# Patient Record
Sex: MALE | Race: OTHER | Employment: OTHER | URBAN - METROPOLITAN AREA
[De-identification: names, ages, dates, MRNs, and addresses within clinical notes are randomized per-mention and may not be internally consistent; named-entity substitution may affect disease eponyms.]

---

## 2017-01-13 PROBLEM — E11.9 DIABETES MELLITUS TYPE 2 WITHOUT RETINOPATHY (HCC): Status: ACTIVE | Noted: 2017-01-13

## 2017-01-13 PROBLEM — H25.813 COMBINED FORMS OF AGE-RELATED CATARACT OF BOTH EYES: Status: ACTIVE | Noted: 2017-01-13

## 2017-03-27 PROBLEM — K11.8 MASS OF PAROTID GLAND: Status: ACTIVE | Noted: 2017-03-27

## 2018-02-08 PROCEDURE — 82607 VITAMIN B-12: CPT | Performed by: INTERNAL MEDICINE

## 2018-03-28 PROBLEM — G20 PARKINSONISM, UNSPECIFIED PARKINSONISM TYPE (HCC): Status: ACTIVE | Noted: 2018-03-28

## 2018-03-28 PROBLEM — R26.81 GAIT INSTABILITY: Status: ACTIVE | Noted: 2018-03-28

## 2021-01-01 ENCOUNTER — EXTERNAL FACILITY (OUTPATIENT)
Dept: FAMILY MEDICINE CLINIC | Facility: CLINIC | Age: 81
End: 2021-01-01

## 2021-01-01 ENCOUNTER — APPOINTMENT (OUTPATIENT)
Dept: GENERAL RADIOLOGY | Facility: HOSPITAL | Age: 81
DRG: 871 | End: 2021-01-01
Payer: MEDICARE

## 2021-01-01 ENCOUNTER — APPOINTMENT (OUTPATIENT)
Dept: GENERAL RADIOLOGY | Facility: HOSPITAL | Age: 81
DRG: 871 | End: 2021-01-01
Attending: NURSE PRACTITIONER
Payer: MEDICARE

## 2021-01-01 ENCOUNTER — APPOINTMENT (OUTPATIENT)
Dept: GENERAL RADIOLOGY | Facility: HOSPITAL | Age: 81
DRG: 871 | End: 2021-01-01
Attending: EMERGENCY MEDICINE
Payer: MEDICARE

## 2021-01-01 ENCOUNTER — APPOINTMENT (OUTPATIENT)
Dept: ULTRASOUND IMAGING | Facility: HOSPITAL | Age: 81
DRG: 871 | End: 2021-01-01
Attending: HOSPITALIST
Payer: MEDICARE

## 2021-01-01 ENCOUNTER — INITIAL APN SNF VISIT (OUTPATIENT)
Dept: INTERNAL MEDICINE CLINIC | Age: 81
End: 2021-01-01

## 2021-01-01 ENCOUNTER — APPOINTMENT (OUTPATIENT)
Dept: GENERAL RADIOLOGY | Facility: HOSPITAL | Age: 81
DRG: 871 | End: 2021-01-01
Attending: INTERNAL MEDICINE
Payer: MEDICARE

## 2021-01-01 ENCOUNTER — APPOINTMENT (OUTPATIENT)
Dept: CT IMAGING | Facility: HOSPITAL | Age: 81
DRG: 871 | End: 2021-01-01
Attending: EMERGENCY MEDICINE
Payer: MEDICARE

## 2021-01-01 ENCOUNTER — HOSPITAL ENCOUNTER (INPATIENT)
Facility: HOSPITAL | Age: 81
LOS: 9 days | Discharge: HOSPICE | DRG: 871 | End: 2021-01-01
Attending: EMERGENCY MEDICINE | Admitting: HOSPITALIST
Payer: MEDICARE

## 2021-01-01 VITALS
HEIGHT: 65 IN | OXYGEN SATURATION: 99 % | DIASTOLIC BLOOD PRESSURE: 61 MMHG | RESPIRATION RATE: 16 BRPM | HEART RATE: 95 BPM | WEIGHT: 121.81 LBS | TEMPERATURE: 99 F | SYSTOLIC BLOOD PRESSURE: 142 MMHG | BODY MASS INDEX: 20.29 KG/M2

## 2021-01-01 VITALS
HEART RATE: 83 BPM | TEMPERATURE: 98 F | SYSTOLIC BLOOD PRESSURE: 124 MMHG | RESPIRATION RATE: 18 BRPM | OXYGEN SATURATION: 97 % | DIASTOLIC BLOOD PRESSURE: 62 MMHG

## 2021-01-01 DIAGNOSIS — R53.1 GENERALIZED WEAKNESS: ICD-10-CM

## 2021-01-01 DIAGNOSIS — Z78.9 IMPAIRED MOBILITY AND ADLS: ICD-10-CM

## 2021-01-01 DIAGNOSIS — Z74.09 IMPAIRED MOBILITY AND ADLS: ICD-10-CM

## 2021-01-01 DIAGNOSIS — E78.49 OTHER HYPERLIPIDEMIA: ICD-10-CM

## 2021-01-01 DIAGNOSIS — R65.21 SEPTIC SHOCK (HCC): Primary | ICD-10-CM

## 2021-01-01 DIAGNOSIS — G20 DEMENTIA DUE TO PARKINSON'S DISEASE WITHOUT BEHAVIORAL DISTURBANCE (HCC): ICD-10-CM

## 2021-01-01 DIAGNOSIS — H91.93 DECREASED HEARING OF BOTH EARS: ICD-10-CM

## 2021-01-01 DIAGNOSIS — D72.829 LEUKOCYTOSIS, UNSPECIFIED TYPE: ICD-10-CM

## 2021-01-01 DIAGNOSIS — A41.9 SEPTIC SHOCK (HCC): ICD-10-CM

## 2021-01-01 DIAGNOSIS — H25.813 COMBINED FORMS OF AGE-RELATED CATARACT OF BOTH EYES: ICD-10-CM

## 2021-01-01 DIAGNOSIS — R53.81 PHYSICAL DECONDITIONING: ICD-10-CM

## 2021-01-01 DIAGNOSIS — E11.9 DIABETES MELLITUS TYPE 2 WITHOUT RETINOPATHY (HCC): ICD-10-CM

## 2021-01-01 DIAGNOSIS — N40.0 BENIGN NON-NODULAR PROSTATIC HYPERPLASIA WITHOUT LOWER URINARY TRACT SYMPTOMS: ICD-10-CM

## 2021-01-01 DIAGNOSIS — A41.9 SEPTIC SHOCK (HCC): Primary | ICD-10-CM

## 2021-01-01 DIAGNOSIS — R79.89 AZOTEMIA: ICD-10-CM

## 2021-01-01 DIAGNOSIS — R26.81 GAIT INSTABILITY: ICD-10-CM

## 2021-01-01 DIAGNOSIS — F02.80 DEMENTIA DUE TO PARKINSON'S DISEASE WITHOUT BEHAVIORAL DISTURBANCE (HCC): ICD-10-CM

## 2021-01-01 DIAGNOSIS — R41.841 COGNITIVE COMMUNICATION DEFICIT: ICD-10-CM

## 2021-01-01 DIAGNOSIS — G30.1 LATE ONSET ALZHEIMER'S DEMENTIA WITHOUT BEHAVIORAL DISTURBANCE (HCC): ICD-10-CM

## 2021-01-01 DIAGNOSIS — H25.13 SENILE NUCLEAR SCLEROSIS, BILATERAL: ICD-10-CM

## 2021-01-01 DIAGNOSIS — F02.80 LATE ONSET ALZHEIMER'S DEMENTIA WITHOUT BEHAVIORAL DISTURBANCE (HCC): ICD-10-CM

## 2021-01-01 DIAGNOSIS — R65.21 SEPTIC SHOCK (HCC): ICD-10-CM

## 2021-01-01 DIAGNOSIS — I10 ESSENTIAL HYPERTENSION, BENIGN: ICD-10-CM

## 2021-01-01 DIAGNOSIS — Z51.5 HOSPICE CARE PATIENT: Primary | ICD-10-CM

## 2021-01-01 DIAGNOSIS — G20 PARKINSONISM, UNSPECIFIED PARKINSONISM TYPE (HCC): Primary | ICD-10-CM

## 2021-01-01 PROCEDURE — 99233 SBSQ HOSP IP/OBS HIGH 50: CPT | Performed by: HOSPITALIST

## 2021-01-01 PROCEDURE — 74177 CT ABD & PELVIS W/CONTRAST: CPT | Performed by: EMERGENCY MEDICINE

## 2021-01-01 PROCEDURE — 3074F SYST BP LT 130 MM HG: CPT | Performed by: NURSE PRACTITIONER

## 2021-01-01 PROCEDURE — 02HV33Z INSERTION OF INFUSION DEVICE INTO SUPERIOR VENA CAVA, PERCUTANEOUS APPROACH: ICD-10-PCS | Performed by: EMERGENCY MEDICINE

## 2021-01-01 PROCEDURE — 93970 EXTREMITY STUDY: CPT | Performed by: HOSPITALIST

## 2021-01-01 PROCEDURE — 99306 1ST NF CARE HIGH MDM 50: CPT | Performed by: FAMILY MEDICINE

## 2021-01-01 PROCEDURE — 71045 X-RAY EXAM CHEST 1 VIEW: CPT | Performed by: EMERGENCY MEDICINE

## 2021-01-01 PROCEDURE — 99239 HOSP IP/OBS DSCHRG MGMT >30: CPT | Performed by: HOSPITALIST

## 2021-01-01 PROCEDURE — 99310 SBSQ NF CARE HIGH MDM 45: CPT | Performed by: NURSE PRACTITIONER

## 2021-01-01 PROCEDURE — 99223 1ST HOSP IP/OBS HIGH 75: CPT | Performed by: NURSE PRACTITIONER

## 2021-01-01 PROCEDURE — 71045 X-RAY EXAM CHEST 1 VIEW: CPT | Performed by: INTERNAL MEDICINE

## 2021-01-01 PROCEDURE — 70450 CT HEAD/BRAIN W/O DYE: CPT | Performed by: EMERGENCY MEDICINE

## 2021-01-01 PROCEDURE — 71045 X-RAY EXAM CHEST 1 VIEW: CPT | Performed by: NURSE PRACTITIONER

## 2021-01-01 PROCEDURE — 71275 CT ANGIOGRAPHY CHEST: CPT | Performed by: EMERGENCY MEDICINE

## 2021-01-01 PROCEDURE — 99232 SBSQ HOSP IP/OBS MODERATE 35: CPT | Performed by: HOSPITALIST

## 2021-01-01 PROCEDURE — 3078F DIAST BP <80 MM HG: CPT | Performed by: NURSE PRACTITIONER

## 2021-01-01 PROCEDURE — 3E0G76Z INTRODUCTION OF NUTRITIONAL SUBSTANCE INTO UPPER GI, VIA NATURAL OR ARTIFICIAL OPENING: ICD-10-PCS | Performed by: HOSPITALIST

## 2021-01-01 PROCEDURE — 99291 CRITICAL CARE FIRST HOUR: CPT | Performed by: INTERNAL MEDICINE

## 2021-01-01 PROCEDURE — 99291 CRITICAL CARE FIRST HOUR: CPT | Performed by: NURSE PRACTITIONER

## 2021-01-01 PROCEDURE — 1111F DSCHRG MED/CURRENT MED MERGE: CPT | Performed by: FAMILY MEDICINE

## 2021-01-01 RX ORDER — DEXTROSE AND SODIUM CHLORIDE 5; .45 G/100ML; G/100ML
INJECTION, SOLUTION INTRAVENOUS CONTINUOUS
Status: DISCONTINUED | OUTPATIENT
Start: 2021-01-01 | End: 2021-01-01

## 2021-01-01 RX ORDER — ACETAMINOPHEN 650 MG/1
650 SUPPOSITORY RECTAL ONCE
Status: COMPLETED | OUTPATIENT
Start: 2021-01-01 | End: 2021-01-01

## 2021-01-01 RX ORDER — POTASSIUM CHLORIDE 29.8 MG/ML
40 INJECTION INTRAVENOUS ONCE
Status: COMPLETED | OUTPATIENT
Start: 2021-01-01 | End: 2021-01-01

## 2021-01-01 RX ORDER — RIVASTIGMINE 4.6 MG/24H
1 PATCH, EXTENDED RELEASE TRANSDERMAL DAILY
Status: DISCONTINUED | OUTPATIENT
Start: 2021-01-01 | End: 2021-01-01

## 2021-01-01 RX ORDER — MINERAL OIL AND PETROLATUM 150; 830 MG/G; MG/G
OINTMENT OPHTHALMIC 3 TIMES DAILY
Status: DISCONTINUED | OUTPATIENT
Start: 2021-01-01 | End: 2021-01-01

## 2021-01-01 RX ORDER — POLYETHYLENE GLYCOL 3350 17 G/17G
17 POWDER, FOR SOLUTION ORAL DAILY PRN
COMMUNITY
End: 2021-01-01

## 2021-01-01 RX ORDER — ACETAMINOPHEN 650 MG/1
650 SUPPOSITORY RECTAL EVERY 6 HOURS PRN
Status: DISCONTINUED | OUTPATIENT
Start: 2021-01-01 | End: 2021-01-01

## 2021-01-01 RX ORDER — POTASSIUM CHLORIDE 14.9 MG/ML
20 INJECTION INTRAVENOUS ONCE
Status: DISCONTINUED | OUTPATIENT
Start: 2021-01-01 | End: 2021-01-01 | Stop reason: SDUPTHER

## 2021-01-01 RX ORDER — DEXTROSE MONOHYDRATE 25 G/50ML
50 INJECTION, SOLUTION INTRAVENOUS
Status: DISCONTINUED | OUTPATIENT
Start: 2021-01-01 | End: 2021-01-01

## 2021-01-01 RX ORDER — MELATONIN
100 DAILY
Status: DISCONTINUED | OUTPATIENT
Start: 2021-01-01 | End: 2021-01-01

## 2021-01-01 RX ORDER — POTASSIUM CHLORIDE 14.9 MG/ML
20 INJECTION INTRAVENOUS ONCE
Status: COMPLETED | OUTPATIENT
Start: 2021-01-01 | End: 2021-01-01

## 2021-01-01 RX ORDER — SODIUM CHLORIDE 9 MG/ML
INJECTION, SOLUTION INTRAVENOUS CONTINUOUS
Status: DISCONTINUED | OUTPATIENT
Start: 2021-01-01 | End: 2021-01-01

## 2021-01-01 RX ORDER — MAGNESIUM SULFATE HEPTAHYDRATE 40 MG/ML
2 INJECTION, SOLUTION INTRAVENOUS ONCE
Status: COMPLETED | OUTPATIENT
Start: 2021-01-01 | End: 2021-01-01

## 2021-01-01 RX ORDER — DEXTROSE AND SODIUM CHLORIDE 5; .9 G/100ML; G/100ML
INJECTION, SOLUTION INTRAVENOUS CONTINUOUS
Status: DISCONTINUED | OUTPATIENT
Start: 2021-01-01 | End: 2021-01-01

## 2021-01-01 RX ORDER — ACETAMINOPHEN 325 MG/1
325 TABLET ORAL EVERY 6 HOURS PRN
COMMUNITY
End: 2021-01-01

## 2021-01-01 RX ORDER — ACETAMINOPHEN 500 MG
1000 TABLET ORAL ONCE
Status: DISCONTINUED | OUTPATIENT
Start: 2021-01-01 | End: 2021-01-01

## 2021-01-01 RX ORDER — INSULIN ASPART 100 [IU]/ML
0.2 INJECTION, SOLUTION INTRAVENOUS; SUBCUTANEOUS ONCE
Status: COMPLETED | OUTPATIENT
Start: 2021-01-01 | End: 2021-01-01

## 2021-01-01 RX ORDER — FOLIC ACID 1 MG/1
1 TABLET ORAL DAILY
Status: DISCONTINUED | OUTPATIENT
Start: 2021-01-01 | End: 2021-01-01

## 2021-01-01 RX ORDER — SODIUM CHLORIDE, SODIUM LACTATE, POTASSIUM CHLORIDE, CALCIUM CHLORIDE 600; 310; 30; 20 MG/100ML; MG/100ML; MG/100ML; MG/100ML
INJECTION, SOLUTION INTRAVENOUS CONTINUOUS
Status: DISCONTINUED | OUTPATIENT
Start: 2021-01-01 | End: 2021-01-01

## 2021-01-01 RX ORDER — SODIUM CHLORIDE 9 MG/ML
1000 INJECTION, SOLUTION INTRAVENOUS CONTINUOUS
Status: DISCONTINUED | OUTPATIENT
Start: 2021-01-01 | End: 2021-01-01

## 2021-01-01 RX ORDER — MULTIPLE VITAMINS W/ MINERALS TAB 9MG-400MCG
1 TAB ORAL DAILY
Status: DISCONTINUED | OUTPATIENT
Start: 2021-01-01 | End: 2021-01-01

## 2021-01-01 RX ORDER — DEXTROSE AND SODIUM CHLORIDE 5; .45 G/100ML; G/100ML
100 INJECTION, SOLUTION INTRAVENOUS CONTINUOUS PRN
Status: DISCONTINUED | OUTPATIENT
Start: 2021-01-01 | End: 2021-01-01

## 2021-01-01 RX ORDER — DOCUSATE SODIUM 100 MG/1
100 CAPSULE, LIQUID FILLED ORAL 2 TIMES DAILY
COMMUNITY
End: 2021-01-01

## 2021-01-01 RX ORDER — HEPARIN SODIUM 5000 [USP'U]/ML
5000 INJECTION, SOLUTION INTRAVENOUS; SUBCUTANEOUS EVERY 8 HOURS SCHEDULED
Status: DISCONTINUED | OUTPATIENT
Start: 2021-01-01 | End: 2021-01-01

## 2021-01-01 RX ORDER — DEXTROSE MONOHYDRATE 50 MG/ML
INJECTION, SOLUTION INTRAVENOUS CONTINUOUS
Status: DISCONTINUED | OUTPATIENT
Start: 2021-01-01 | End: 2021-01-01

## 2021-02-01 ENCOUNTER — HOSPITAL ENCOUNTER (INPATIENT)
Age: 81
LOS: 2 days | Discharge: HOME HEALTH | DRG: 149 | End: 2021-02-03
Attending: EMERGENCY MEDICINE | Admitting: HOSPITALIST

## 2021-02-01 ENCOUNTER — APPOINTMENT (OUTPATIENT)
Dept: MRI IMAGING | Age: 81
DRG: 149 | End: 2021-02-01
Attending: EMERGENCY MEDICINE

## 2021-02-01 ENCOUNTER — APPOINTMENT (OUTPATIENT)
Dept: GENERAL RADIOLOGY | Age: 81
DRG: 149 | End: 2021-02-01
Attending: EMERGENCY MEDICINE

## 2021-02-01 ENCOUNTER — APPOINTMENT (OUTPATIENT)
Dept: CT IMAGING | Age: 81
DRG: 149 | End: 2021-02-01
Attending: EMERGENCY MEDICINE

## 2021-02-01 DIAGNOSIS — R42 VERTIGO: ICD-10-CM

## 2021-02-01 DIAGNOSIS — G91.2 NORMAL PRESSURE HYDROCEPHALUS (CMD): Primary | ICD-10-CM

## 2021-02-01 DIAGNOSIS — K11.8 PAROTID MASS: ICD-10-CM

## 2021-02-01 LAB
ALBUMIN SERPL-MCNC: 3.7 G/DL (ref 3.6–5.1)
ALBUMIN/GLOB SERPL: 1 {RATIO} (ref 1–2.4)
ALP SERPL-CCNC: 75 UNITS/L (ref 45–117)
ALT SERPL-CCNC: 19 UNITS/L
ANION GAP SERPL CALC-SCNC: 10 MMOL/L (ref 10–20)
APPEARANCE UR: CLEAR
AST SERPL-CCNC: 13 UNITS/L
BASOPHILS # BLD: 0.1 K/MCL (ref 0–0.3)
BASOPHILS NFR BLD: 1 %
BILIRUB SERPL-MCNC: 0.5 MG/DL (ref 0.2–1)
BILIRUB UR QL STRIP: NEGATIVE
BUN SERPL-MCNC: 16 MG/DL (ref 6–20)
BUN/CREAT SERPL: 25 (ref 7–25)
CALCIUM SERPL-MCNC: 9.2 MG/DL (ref 8.4–10.2)
CHLORIDE SERPL-SCNC: 105 MMOL/L (ref 98–107)
CO2 SERPL-SCNC: 28 MMOL/L (ref 21–32)
COLOR UR: ABNORMAL
CREAT SERPL-MCNC: 0.64 MG/DL (ref 0.67–1.17)
DEPRECATED RDW RBC: 39.1 FL (ref 39–50)
EOSINOPHIL # BLD: 0.4 K/MCL (ref 0–0.5)
EOSINOPHIL NFR BLD: 4 %
ERYTHROCYTE [DISTWIDTH] IN BLOOD: 13.8 % (ref 11–15)
ERYTHROCYTE [SEDIMENTATION RATE] IN BLOOD BY WESTERGREN METHOD: 24 MM/HR (ref 0–20)
FASTING DURATION TIME PATIENT: ABNORMAL H
GFR SERPLBLD BASED ON 1.73 SQ M-ARVRAT: >90 ML/MIN/1.73M2
GLOBULIN SER-MCNC: 3.6 G/DL (ref 2–4)
GLUCOSE SERPL-MCNC: 150 MG/DL (ref 65–99)
GLUCOSE UR STRIP-MCNC: >=500 MG/DL
HCT VFR BLD CALC: 42.9 % (ref 39–51)
HGB BLD-MCNC: 13.3 G/DL (ref 13–17)
HGB UR QL STRIP: NEGATIVE
IMM GRANULOCYTES # BLD AUTO: 0 K/MCL (ref 0–0.2)
IMM GRANULOCYTES # BLD: 1 %
KETONES UR STRIP-MCNC: 20 MG/DL
LEUKOCYTE ESTERASE UR QL STRIP: NEGATIVE
LIPASE SERPL-CCNC: 100 UNITS/L (ref 73–393)
LYMPHOCYTES # BLD: 2.2 K/MCL (ref 1–4)
LYMPHOCYTES NFR BLD: 27 %
MAGNESIUM SERPL-MCNC: 1.9 MG/DL (ref 1.7–2.4)
MCH RBC QN AUTO: 24.4 PG (ref 26–34)
MCHC RBC AUTO-ENTMCNC: 31 G/DL (ref 32–36.5)
MCV RBC AUTO: 78.9 FL (ref 78–100)
MONOCYTES # BLD: 0.7 K/MCL (ref 0.3–0.9)
MONOCYTES NFR BLD: 9 %
NEUTROPHILS # BLD: 4.9 K/MCL (ref 1.8–7.7)
NEUTROPHILS NFR BLD: 58 %
NITRITE UR QL STRIP: NEGATIVE
NRBC BLD MANUAL-RTO: 0 /100 WBC
PH UR STRIP: 5 [PH] (ref 5–7)
PLATELET # BLD AUTO: 248 K/MCL (ref 140–450)
POTASSIUM SERPL-SCNC: 3.8 MMOL/L (ref 3.4–5.1)
PROT SERPL-MCNC: 7.3 G/DL (ref 6.4–8.2)
PROT UR STRIP-MCNC: NEGATIVE MG/DL
RBC # BLD: 5.44 MIL/MCL (ref 4.5–5.9)
SARS-COV-2 RNA RESP QL NAA+PROBE: NOT DETECTED
SERVICE CMNT-IMP: NORMAL
SERVICE CMNT-IMP: NORMAL
SODIUM SERPL-SCNC: 139 MMOL/L (ref 135–145)
SP GR UR STRIP: 1.03 (ref 1–1.03)
UROBILINOGEN UR STRIP-MCNC: 0.2 MG/DL
WBC # BLD: 8.4 K/MCL (ref 4.2–11)

## 2021-02-01 PROCEDURE — 10003585 HB ROOM CHARGE INTERMEDIATE CARE

## 2021-02-01 PROCEDURE — 70450 CT HEAD/BRAIN W/O DYE: CPT

## 2021-02-01 PROCEDURE — 81003 URINALYSIS AUTO W/O SCOPE: CPT | Performed by: EMERGENCY MEDICINE

## 2021-02-01 PROCEDURE — 70551 MRI BRAIN STEM W/O DYE: CPT

## 2021-02-01 PROCEDURE — 71045 X-RAY EXAM CHEST 1 VIEW: CPT

## 2021-02-01 PROCEDURE — 93005 ELECTROCARDIOGRAM TRACING: CPT | Performed by: EMERGENCY MEDICINE

## 2021-02-01 PROCEDURE — U0003 INFECTIOUS AGENT DETECTION BY NUCLEIC ACID (DNA OR RNA); SEVERE ACUTE RESPIRATORY SYNDROME CORONAVIRUS 2 (SARS-COV-2) (CORONAVIRUS DISEASE [COVID-19]), AMPLIFIED PROBE TECHNIQUE, MAKING USE OF HIGH THROUGHPUT TECHNOLOGIES AS DESCRIBED BY CMS-2020-01-R: HCPCS | Performed by: EMERGENCY MEDICINE

## 2021-02-01 PROCEDURE — 36415 COLL VENOUS BLD VENIPUNCTURE: CPT

## 2021-02-01 PROCEDURE — 80053 COMPREHEN METABOLIC PANEL: CPT | Performed by: EMERGENCY MEDICINE

## 2021-02-01 PROCEDURE — 70544 MR ANGIOGRAPHY HEAD W/O DYE: CPT

## 2021-02-01 PROCEDURE — 85652 RBC SED RATE AUTOMATED: CPT | Performed by: EMERGENCY MEDICINE

## 2021-02-01 PROCEDURE — 99285 EMERGENCY DEPT VISIT HI MDM: CPT

## 2021-02-01 PROCEDURE — 85025 COMPLETE CBC W/AUTO DIFF WBC: CPT | Performed by: EMERGENCY MEDICINE

## 2021-02-01 PROCEDURE — 83690 ASSAY OF LIPASE: CPT | Performed by: EMERGENCY MEDICINE

## 2021-02-01 PROCEDURE — 83735 ASSAY OF MAGNESIUM: CPT | Performed by: EMERGENCY MEDICINE

## 2021-02-01 RX ORDER — LOSARTAN POTASSIUM 50 MG/1
50 TABLET ORAL DAILY
COMMUNITY

## 2021-02-01 RX ORDER — TRIAMCINOLONE ACETONIDE 1 MG/G
1 CREAM TOPICAL 2 TIMES DAILY PRN
COMMUNITY

## 2021-02-01 RX ORDER — CHOLECALCIFEROL (VITAMIN D3) 125 MCG
50 CAPSULE ORAL
COMMUNITY

## 2021-02-01 RX ORDER — PRAVASTATIN SODIUM 40 MG
20 TABLET ORAL DAILY
COMMUNITY

## 2021-02-01 RX ORDER — AMANTADINE HYDROCHLORIDE 100 MG/1
100 CAPSULE, GELATIN COATED ORAL
Status: ON HOLD | COMMUNITY
End: 2021-05-02 | Stop reason: SINTOL

## 2021-02-01 RX ORDER — LEVODOPA AND CARBIDOPA 95; 23.75 MG/1; MG/1
2 CAPSULE, EXTENDED RELEASE ORAL 2 TIMES DAILY
COMMUNITY

## 2021-02-01 RX ORDER — RIVASTIGMINE 4.6 MG/24H
1 PATCH, EXTENDED RELEASE TRANSDERMAL EVERY EVENING
COMMUNITY

## 2021-02-01 RX ORDER — 0.9 % SODIUM CHLORIDE 0.9 %
2 VIAL (ML) INJECTION EVERY 12 HOURS SCHEDULED
Status: DISCONTINUED | OUTPATIENT
Start: 2021-02-02 | End: 2021-02-03 | Stop reason: HOSPADM

## 2021-02-01 RX ORDER — ELECTROLYTES/DEXTROSE
1 SOLUTION, ORAL ORAL
COMMUNITY

## 2021-02-01 ASSESSMENT — PAIN SCALES - GENERAL: PAINLEVEL_OUTOF10: 0

## 2021-02-01 ASSESSMENT — ENCOUNTER SYMPTOMS
BACK PAIN: 0
EYE REDNESS: 0
COUGH: 0
ADENOPATHY: 0
CHILLS: 0
AGITATION: 0
ABDOMINAL PAIN: 0
DIZZINESS: 1

## 2021-02-01 ASSESSMENT — LIFESTYLE VARIABLES
ALCOHOL_USE_STATUS: NO OR LOW RISK WITH VALIDATED TOOL
AUDIT-C TOTAL SCORE: 0
HOW OFTEN DO YOU HAVE 6 OR MORE DRINKS ON ONE OCCASION: NEVER
HOW OFTEN DO YOU HAVE 6 OR MORE DRINKS ON ONE OCCASION: NEVER
HOW OFTEN DO YOU HAVE A DRINK CONTAINING ALCOHOL: NEVER
ALCOHOL_USE_STATUS: NO OR LOW RISK WITH VALIDATED TOOL
HOW MANY STANDARD DRINKS CONTAINING ALCOHOL DO YOU HAVE ON A TYPICAL DAY: 0,1 OR 2
HOW OFTEN DO YOU HAVE A DRINK CONTAINING ALCOHOL: NEVER
HOW MANY STANDARD DRINKS CONTAINING ALCOHOL DO YOU HAVE ON A TYPICAL DAY: 0,1 OR 2
AUDIT-C TOTAL SCORE: 0

## 2021-02-01 ASSESSMENT — COGNITIVE AND FUNCTIONAL STATUS - GENERAL
DO YOU HAVE DIFFICULTY DRESSING OR BATHING: NO
BECAUSE OF A PHYSICAL, MENTAL, OR EMOTIONAL CONDITION, DO YOU HAVE SERIOUS DIFFICULTY CONCENTRATING, REMEMBERING OR MAKING DECISIONS: YES
DO YOU HAVE SERIOUS DIFFICULTY WALKING OR CLIMBING STAIRS: YES
ARE YOU BLIND OR DO YOU HAVE SERIOUS DIFFICULTY SEEING, EVEN WHEN WEARING GLASSES: NO
ARE YOU DEAF OR DO YOU HAVE SERIOUS DIFFICULTY  HEARING: YES
BECAUSE OF A PHYSICAL, MENTAL, OR EMOTIONAL CONDITION, DO YOU HAVE DIFFICULTY DOING ERRANDS ALONE: NO

## 2021-02-01 ASSESSMENT — ACTIVITIES OF DAILY LIVING (ADL)
DRESSING YOURSELF: INDEPENDENT
ADL_SHORT_OF_BREATH: NO
DRESSING YOURSELF: INDEPENDENT
ADL_SCORE: 10
TRANSFERRING: NEEDS ASSISTANCE
BATHING: INDEPENDENT
MOBILITY_ASSIST_DEVICES: STANDARD WALKER
ADL_SCORE: 11
CONTINENCE: INDEPENDENT
ADL_BEFORE_ADMISSION: NEEDS/REQUIRES ASSISTANCE
FEEDING YOURSELF: INDEPENDENT
ADL_BEFORE_ADMISSION: NEEDS/REQUIRES ASSISTANCE
BATHING: INDEPENDENT
TOILETING: INDEPENDENT
CHRONIC_PAIN_PRESENT: NO
FEEDING YOURSELF: INDEPENDENT
CONTINENCE: NEEDS ASSISTANCE
RECENT_DECLINE_ADL: YES, DECLINE IN BATHING/DRESSING/FEEDING, COLLABORATE WITH PROVIDER (T)
TRANSFERRING: NEEDS ASSISTANCE
TOILETING: INDEPENDENT

## 2021-02-02 LAB
ANION GAP SERPL CALC-SCNC: 8 MMOL/L (ref 10–20)
ATRIAL RATE (BPM): 84
BASOPHILS # BLD: 0.1 K/MCL (ref 0–0.3)
BASOPHILS NFR BLD: 1 %
BUN SERPL-MCNC: 16 MG/DL (ref 6–20)
BUN/CREAT SERPL: 26 (ref 7–25)
CALCIUM SERPL-MCNC: 9.1 MG/DL (ref 8.4–10.2)
CHLORIDE SERPL-SCNC: 105 MMOL/L (ref 98–107)
CO2 SERPL-SCNC: 29 MMOL/L (ref 21–32)
CREAT SERPL-MCNC: 0.61 MG/DL (ref 0.67–1.17)
DEPRECATED RDW RBC: 39 FL (ref 39–50)
EOSINOPHIL # BLD: 0.3 K/MCL (ref 0–0.5)
EOSINOPHIL NFR BLD: 3 %
ERYTHROCYTE [DISTWIDTH] IN BLOOD: 13.9 % (ref 11–15)
FASTING DURATION TIME PATIENT: ABNORMAL H
GFR SERPLBLD BASED ON 1.73 SQ M-ARVRAT: >90 ML/MIN/1.73M2
GLUCOSE BLDC GLUCOMTR-MCNC: 171 MG/DL (ref 70–99)
GLUCOSE BLDC GLUCOMTR-MCNC: 223 MG/DL (ref 70–99)
GLUCOSE BLDC GLUCOMTR-MCNC: 300 MG/DL (ref 70–99)
GLUCOSE BLDC GLUCOMTR-MCNC: 310 MG/DL (ref 70–99)
GLUCOSE SERPL-MCNC: 156 MG/DL (ref 65–99)
HCT VFR BLD CALC: 42.2 % (ref 39–51)
HGB BLD-MCNC: 13.5 G/DL (ref 13–17)
IMM GRANULOCYTES # BLD AUTO: 0 K/MCL (ref 0–0.2)
IMM GRANULOCYTES # BLD: 0 %
LYMPHOCYTES # BLD: 2.2 K/MCL (ref 1–4)
LYMPHOCYTES NFR BLD: 23 %
MCH RBC QN AUTO: 25.1 PG (ref 26–34)
MCHC RBC AUTO-ENTMCNC: 32 G/DL (ref 32–36.5)
MCV RBC AUTO: 78.4 FL (ref 78–100)
MONOCYTES # BLD: 0.9 K/MCL (ref 0.3–0.9)
MONOCYTES NFR BLD: 10 %
NEUTROPHILS # BLD: 6.1 K/MCL (ref 1.8–7.7)
NEUTROPHILS NFR BLD: 63 %
NRBC BLD MANUAL-RTO: 0 /100 WBC
P AXIS (DEGREES): 60
PLATELET # BLD AUTO: 224 K/MCL (ref 140–450)
POTASSIUM SERPL-SCNC: 3.8 MMOL/L (ref 3.4–5.1)
PR-INTERVAL (MSEC): 130
QRS-INTERVAL (MSEC): 82
QT-INTERVAL (MSEC): 380
QTC: 449
R AXIS (DEGREES): -17
RAINBOW EXTRA TUBES HOLD SPECIMEN: NORMAL
RBC # BLD: 5.38 MIL/MCL (ref 4.5–5.9)
REPORT TEXT: NORMAL
SODIUM SERPL-SCNC: 138 MMOL/L (ref 135–145)
T AXIS (DEGREES): 46
VENTRICULAR RATE EKG/MIN (BPM): 84
WBC # BLD: 9.6 K/MCL (ref 4.2–11)

## 2021-02-02 PROCEDURE — 10002803 HB RX 637: Performed by: INTERNAL MEDICINE

## 2021-02-02 PROCEDURE — 97116 GAIT TRAINING THERAPY: CPT

## 2021-02-02 PROCEDURE — 36415 COLL VENOUS BLD VENIPUNCTURE: CPT | Performed by: HOSPITALIST

## 2021-02-02 PROCEDURE — 97161 PT EVAL LOW COMPLEX 20 MIN: CPT

## 2021-02-02 PROCEDURE — 10003585 HB ROOM CHARGE INTERMEDIATE CARE

## 2021-02-02 PROCEDURE — 80048 BASIC METABOLIC PNL TOTAL CA: CPT | Performed by: HOSPITALIST

## 2021-02-02 PROCEDURE — 10004651 HB RX, NO CHARGE ITEM: Performed by: INTERNAL MEDICINE

## 2021-02-02 PROCEDURE — 85025 COMPLETE CBC W/AUTO DIFF WBC: CPT | Performed by: HOSPITALIST

## 2021-02-02 PROCEDURE — 10002800 HB RX 250 W HCPCS: Performed by: INTERNAL MEDICINE

## 2021-02-02 PROCEDURE — 10004651 HB RX, NO CHARGE ITEM: Performed by: HOSPITALIST

## 2021-02-02 PROCEDURE — 10002803 HB RX 637: Performed by: PSYCHIATRY & NEUROLOGY

## 2021-02-02 RX ORDER — PRAVASTATIN SODIUM 20 MG
20 TABLET ORAL DAILY
Status: DISCONTINUED | OUTPATIENT
Start: 2021-02-02 | End: 2021-02-03 | Stop reason: HOSPADM

## 2021-02-02 RX ORDER — NICOTINE POLACRILEX 4 MG
30 LOZENGE BUCCAL PRN
Status: DISCONTINUED | OUTPATIENT
Start: 2021-02-02 | End: 2021-02-03 | Stop reason: HOSPADM

## 2021-02-02 RX ORDER — NICOTINE POLACRILEX 4 MG
15 LOZENGE BUCCAL PRN
Status: DISCONTINUED | OUTPATIENT
Start: 2021-02-02 | End: 2021-02-03 | Stop reason: HOSPADM

## 2021-02-02 RX ORDER — RIVASTIGMINE 4.6 MG/24H
1 PATCH, EXTENDED RELEASE TRANSDERMAL DAILY
Status: DISCONTINUED | OUTPATIENT
Start: 2021-02-02 | End: 2021-02-03 | Stop reason: HOSPADM

## 2021-02-02 RX ORDER — LOSARTAN POTASSIUM 50 MG/1
50 TABLET ORAL DAILY
Status: DISCONTINUED | OUTPATIENT
Start: 2021-02-02 | End: 2021-02-03 | Stop reason: HOSPADM

## 2021-02-02 RX ORDER — DEXTROSE MONOHYDRATE 25 G/50ML
12.5 INJECTION, SOLUTION INTRAVENOUS PRN
Status: DISCONTINUED | OUTPATIENT
Start: 2021-02-02 | End: 2021-02-03 | Stop reason: HOSPADM

## 2021-02-02 RX ORDER — DEXTROSE MONOHYDRATE 25 G/50ML
25 INJECTION, SOLUTION INTRAVENOUS PRN
Status: DISCONTINUED | OUTPATIENT
Start: 2021-02-02 | End: 2021-02-03 | Stop reason: HOSPADM

## 2021-02-02 RX ORDER — AMANTADINE HYDROCHLORIDE 100 MG/1
100 CAPSULE, GELATIN COATED ORAL DAILY
Status: DISCONTINUED | OUTPATIENT
Start: 2021-02-02 | End: 2021-02-03 | Stop reason: HOSPADM

## 2021-02-02 RX ADMIN — SODIUM CHLORIDE, PRESERVATIVE FREE 2 ML: 5 INJECTION INTRAVENOUS at 10:47

## 2021-02-02 RX ADMIN — ASPIRIN 81 MG: 81 TABLET, COATED ORAL at 10:46

## 2021-02-02 RX ADMIN — AMANTADINE 100 MG: 100 CAPSULE ORAL at 10:45

## 2021-02-02 RX ADMIN — RIVASTIGMINE 1 PATCH: 4.6 PATCH TRANSDERMAL at 13:34

## 2021-02-02 RX ADMIN — PRAVASTATIN SODIUM 20 MG: 20 TABLET ORAL at 10:46

## 2021-02-02 RX ADMIN — SODIUM CHLORIDE, PRESERVATIVE FREE 2 ML: 5 INJECTION INTRAVENOUS at 22:00

## 2021-02-02 RX ADMIN — LEVODOPA AND CARBIDOPA 1 CAPSULE: 95; 23.75 CAPSULE, EXTENDED RELEASE ORAL at 13:28

## 2021-02-02 RX ADMIN — LEVODOPA AND CARBIDOPA 1 CAPSULE: 95; 23.75 CAPSULE, EXTENDED RELEASE ORAL at 10:45

## 2021-02-02 RX ADMIN — INSULIN LISPRO 4 UNITS: 100 INJECTION, SOLUTION INTRAVENOUS; SUBCUTANEOUS at 15:02

## 2021-02-02 RX ADMIN — LOSARTAN POTASSIUM 50 MG: 50 TABLET, FILM COATED ORAL at 10:45

## 2021-02-02 ASSESSMENT — COGNITIVE AND FUNCTIONAL STATUS - GENERAL
BASIC_MOBILITY_CONVERTED_SCORE: 22.61
BASIC_MOBILITY_RAW_SCORE: 8

## 2021-02-02 ASSESSMENT — PAIN SCALES - GENERAL
PAINLEVEL_OUTOF10: 0
PAINLEVEL_OUTOF10: 0

## 2021-02-03 VITALS
RESPIRATION RATE: 18 BRPM | HEART RATE: 101 BPM | WEIGHT: 145.72 LBS | BODY MASS INDEX: 22.09 KG/M2 | SYSTOLIC BLOOD PRESSURE: 134 MMHG | HEIGHT: 68 IN | OXYGEN SATURATION: 98 % | TEMPERATURE: 97.7 F | DIASTOLIC BLOOD PRESSURE: 75 MMHG

## 2021-02-03 LAB
GLUCOSE BLDC GLUCOMTR-MCNC: 168 MG/DL (ref 70–99)
GLUCOSE BLDC GLUCOMTR-MCNC: 172 MG/DL (ref 70–99)
GLUCOSE BLDC GLUCOMTR-MCNC: 209 MG/DL (ref 70–99)

## 2021-02-03 PROCEDURE — 10004651 HB RX, NO CHARGE ITEM: Performed by: INTERNAL MEDICINE

## 2021-02-03 PROCEDURE — 10002800 HB RX 250 W HCPCS: Performed by: INTERNAL MEDICINE

## 2021-02-03 PROCEDURE — 10002803 HB RX 637: Performed by: INTERNAL MEDICINE

## 2021-02-03 PROCEDURE — 10004651 HB RX, NO CHARGE ITEM: Performed by: HOSPITALIST

## 2021-02-03 PROCEDURE — 97110 THERAPEUTIC EXERCISES: CPT

## 2021-02-03 PROCEDURE — 97116 GAIT TRAINING THERAPY: CPT

## 2021-02-03 PROCEDURE — 10002803 HB RX 637: Performed by: PSYCHIATRY & NEUROLOGY

## 2021-02-03 RX ADMIN — INSULIN LISPRO 2 UNITS: 100 INJECTION, SOLUTION INTRAVENOUS; SUBCUTANEOUS at 12:40

## 2021-02-03 RX ADMIN — PRAVASTATIN SODIUM 20 MG: 20 TABLET ORAL at 09:53

## 2021-02-03 RX ADMIN — INSULIN LISPRO 1 UNITS: 100 INJECTION, SOLUTION INTRAVENOUS; SUBCUTANEOUS at 09:53

## 2021-02-03 RX ADMIN — LEVODOPA AND CARBIDOPA 1 CAPSULE: 95; 23.75 CAPSULE, EXTENDED RELEASE ORAL at 09:59

## 2021-02-03 RX ADMIN — ASPIRIN 81 MG: 81 TABLET, COATED ORAL at 12:40

## 2021-02-03 RX ADMIN — LOSARTAN POTASSIUM 50 MG: 50 TABLET, FILM COATED ORAL at 09:53

## 2021-02-03 RX ADMIN — AMANTADINE 100 MG: 100 CAPSULE ORAL at 09:53

## 2021-02-03 RX ADMIN — RIVASTIGMINE 1 PATCH: 4.6 PATCH TRANSDERMAL at 09:55

## 2021-02-03 RX ADMIN — LEVODOPA AND CARBIDOPA 1 CAPSULE: 95; 23.75 CAPSULE, EXTENDED RELEASE ORAL at 12:40

## 2021-02-03 RX ADMIN — SODIUM CHLORIDE, PRESERVATIVE FREE 2 ML: 5 INJECTION INTRAVENOUS at 10:00

## 2021-02-03 ASSESSMENT — COGNITIVE AND FUNCTIONAL STATUS - GENERAL
BASIC_MOBILITY_CONVERTED_SCORE: 32.23
BASIC_MOBILITY_RAW_SCORE: 12

## 2021-02-03 ASSESSMENT — PAIN SCALES - GENERAL: PAINLEVEL_OUTOF10: 0

## 2021-05-01 ENCOUNTER — APPOINTMENT (OUTPATIENT)
Dept: GENERAL RADIOLOGY | Age: 81
End: 2021-05-01
Attending: EMERGENCY MEDICINE

## 2021-05-01 ENCOUNTER — HOSPITAL ENCOUNTER (OUTPATIENT)
Age: 81
Setting detail: OBSERVATION
Discharge: NURSING, REHAB OR LONG-TERM CARE | End: 2021-05-02
Attending: EMERGENCY MEDICINE | Admitting: HOSPITALIST

## 2021-05-01 DIAGNOSIS — L03.119 CELLULITIS OF LOWER EXTREMITY, UNSPECIFIED LATERALITY: ICD-10-CM

## 2021-05-01 DIAGNOSIS — R53.1 WEAKNESS GENERALIZED: Primary | ICD-10-CM

## 2021-05-01 DIAGNOSIS — G20.A1 PARKINSON DISEASE: ICD-10-CM

## 2021-05-01 LAB
ALBUMIN SERPL-MCNC: 3.9 G/DL (ref 3.6–5.1)
ALBUMIN/GLOB SERPL: 1 {RATIO} (ref 1–2.4)
ALP SERPL-CCNC: 121 UNITS/L (ref 45–117)
ALT SERPL-CCNC: 27 UNITS/L
ANION GAP SERPL CALC-SCNC: 10 MMOL/L (ref 10–20)
AST SERPL-CCNC: 17 UNITS/L
BASOPHILS # BLD: 0.1 K/MCL (ref 0–0.3)
BASOPHILS NFR BLD: 1 %
BILIRUB SERPL-MCNC: 0.3 MG/DL (ref 0.2–1)
BUN SERPL-MCNC: 13 MG/DL (ref 6–20)
BUN/CREAT SERPL: 21 (ref 7–25)
CALCIUM SERPL-MCNC: 9.6 MG/DL (ref 8.4–10.2)
CHLORIDE SERPL-SCNC: 103 MMOL/L (ref 98–107)
CO2 SERPL-SCNC: 27 MMOL/L (ref 21–32)
CREAT SERPL-MCNC: 0.62 MG/DL (ref 0.67–1.17)
DEPRECATED RDW RBC: 38.4 FL (ref 39–50)
EOSINOPHIL # BLD: 0.5 K/MCL (ref 0–0.5)
EOSINOPHIL NFR BLD: 5 %
ERYTHROCYTE [DISTWIDTH] IN BLOOD: 13.5 % (ref 11–15)
FASTING DURATION TIME PATIENT: ABNORMAL H
GFR SERPLBLD BASED ON 1.73 SQ M-ARVRAT: >90 ML/MIN/1.73M2
GLOBULIN SER-MCNC: 3.9 G/DL (ref 2–4)
GLUCOSE BLDC GLUCOMTR-MCNC: 197 MG/DL (ref 70–99)
GLUCOSE SERPL-MCNC: 255 MG/DL (ref 65–99)
HCT VFR BLD CALC: 41.6 % (ref 39–51)
HGB BLD-MCNC: 13.5 G/DL (ref 13–17)
IMM GRANULOCYTES # BLD AUTO: 0 K/MCL (ref 0–0.2)
IMM GRANULOCYTES # BLD: 0 %
LACTATE BLDV-SCNC: 2 MMOL/L (ref 0–2)
LACTATE BLDV-SCNC: 2.9 MMOL/L
LYMPHOCYTES # BLD: 2.9 K/MCL (ref 1–4)
LYMPHOCYTES NFR BLD: 32 %
MCH RBC QN AUTO: 25.7 PG (ref 26–34)
MCHC RBC AUTO-ENTMCNC: 32.5 G/DL (ref 32–36.5)
MCV RBC AUTO: 79.1 FL (ref 78–100)
MONOCYTES # BLD: 0.9 K/MCL (ref 0.3–0.9)
MONOCYTES NFR BLD: 11 %
NEUTROPHILS # BLD: 4.6 K/MCL (ref 1.8–7.7)
NEUTROPHILS NFR BLD: 51 %
NRBC BLD MANUAL-RTO: 0 /100 WBC
PLATELET # BLD AUTO: 243 K/MCL (ref 140–450)
POTASSIUM SERPL-SCNC: 3.9 MMOL/L (ref 3.4–5.1)
PROT SERPL-MCNC: 7.8 G/DL (ref 6.4–8.2)
RBC # BLD: 5.26 MIL/MCL (ref 4.5–5.9)
SARS-COV-2 RNA RESP QL NAA+PROBE: NOT DETECTED
SERVICE CMNT-IMP: NORMAL
SERVICE CMNT-IMP: NORMAL
SODIUM SERPL-SCNC: 136 MMOL/L (ref 135–145)
TROPONIN I SERPL HS-MCNC: <0.02 NG/ML
WBC # BLD: 9 K/MCL (ref 4.2–11)

## 2021-05-01 PROCEDURE — 96375 TX/PRO/DX INJ NEW DRUG ADDON: CPT

## 2021-05-01 PROCEDURE — 87635 SARS-COV-2 COVID-19 AMP PRB: CPT | Performed by: EMERGENCY MEDICINE

## 2021-05-01 PROCEDURE — 83605 ASSAY OF LACTIC ACID: CPT

## 2021-05-01 PROCEDURE — 84484 ASSAY OF TROPONIN QUANT: CPT | Performed by: EMERGENCY MEDICINE

## 2021-05-01 PROCEDURE — G0378 HOSPITAL OBSERVATION PER HR: HCPCS

## 2021-05-01 PROCEDURE — 80053 COMPREHEN METABOLIC PANEL: CPT | Performed by: EMERGENCY MEDICINE

## 2021-05-01 PROCEDURE — 87040 BLOOD CULTURE FOR BACTERIA: CPT | Performed by: EMERGENCY MEDICINE

## 2021-05-01 PROCEDURE — 10002800 HB RX 250 W HCPCS: Performed by: EMERGENCY MEDICINE

## 2021-05-01 PROCEDURE — 36415 COLL VENOUS BLD VENIPUNCTURE: CPT

## 2021-05-01 PROCEDURE — 99285 EMERGENCY DEPT VISIT HI MDM: CPT

## 2021-05-01 PROCEDURE — 96365 THER/PROPH/DIAG IV INF INIT: CPT

## 2021-05-01 PROCEDURE — 10002807 HB RX 258: Performed by: EMERGENCY MEDICINE

## 2021-05-01 PROCEDURE — 85025 COMPLETE CBC W/AUTO DIFF WBC: CPT | Performed by: EMERGENCY MEDICINE

## 2021-05-01 PROCEDURE — C9803 HOPD COVID-19 SPEC COLLECT: HCPCS

## 2021-05-01 PROCEDURE — 93005 ELECTROCARDIOGRAM TRACING: CPT | Performed by: EMERGENCY MEDICINE

## 2021-05-01 PROCEDURE — 71045 X-RAY EXAM CHEST 1 VIEW: CPT

## 2021-05-01 PROCEDURE — 10004281 HB COUNTER-STAFF TIME PER 15 MIN

## 2021-05-01 PROCEDURE — 96366 THER/PROPH/DIAG IV INF ADDON: CPT

## 2021-05-01 PROCEDURE — 82962 GLUCOSE BLOOD TEST: CPT

## 2021-05-01 RX ORDER — SODIUM CHLORIDE 9 MG/ML
INJECTION, SOLUTION INTRAVENOUS CONTINUOUS
Status: DISCONTINUED | OUTPATIENT
Start: 2021-05-02 | End: 2021-05-02

## 2021-05-01 RX ORDER — CEFAZOLIN SODIUM/WATER 2 G/20 ML
2000 SYRINGE (ML) INTRAVENOUS DAILY
Status: DISCONTINUED | OUTPATIENT
Start: 2021-05-02 | End: 2021-05-01

## 2021-05-01 RX ORDER — 0.9 % SODIUM CHLORIDE 0.9 %
2 VIAL (ML) INJECTION EVERY 12 HOURS SCHEDULED
Status: DISCONTINUED | OUTPATIENT
Start: 2021-05-01 | End: 2021-05-02 | Stop reason: HOSPADM

## 2021-05-01 RX ORDER — HEPARIN SODIUM 5000 [USP'U]/ML
5000 INJECTION, SOLUTION INTRAVENOUS; SUBCUTANEOUS EVERY 8 HOURS SCHEDULED
Status: DISCONTINUED | OUTPATIENT
Start: 2021-05-02 | End: 2021-05-02 | Stop reason: HOSPADM

## 2021-05-01 RX ORDER — CEFAZOLIN SODIUM/WATER 2 G/20 ML
2000 SYRINGE (ML) INTRAVENOUS ONCE
Status: COMPLETED | OUTPATIENT
Start: 2021-05-01 | End: 2021-05-01

## 2021-05-01 RX ADMIN — SODIUM CHLORIDE 1000 ML: 9 INJECTION, SOLUTION INTRAVENOUS at 20:07

## 2021-05-01 RX ADMIN — VANCOMYCIN HYDROCHLORIDE 1500 MG: 10 INJECTION, POWDER, LYOPHILIZED, FOR SOLUTION INTRAVENOUS at 21:08

## 2021-05-01 RX ADMIN — SODIUM CHLORIDE 1000 ML: 9 INJECTION, SOLUTION INTRAVENOUS at 21:09

## 2021-05-01 RX ADMIN — Medication 2000 MG: at 20:38

## 2021-05-01 RX ADMIN — SODIUM CHLORIDE 1000 ML: 9 INJECTION, SOLUTION INTRAVENOUS at 20:32

## 2021-05-01 ASSESSMENT — PAIN SCALES - GENERAL: PAINLEVEL_OUTOF10: 0

## 2021-05-01 ASSESSMENT — ENCOUNTER SYMPTOMS
EYE PAIN: 0
FEVER: 0
NAUSEA: 0
ABDOMINAL PAIN: 0
CHILLS: 0
ACTIVITY CHANGE: 0
WOUND: 0
VOMITING: 0
COUGH: 0
COLOR CHANGE: 0
CONFUSION: 0
BRUISES/BLEEDS EASILY: 0
BACK PAIN: 0
WEAKNESS: 1
WHEEZING: 0
NUMBNESS: 0
TREMORS: 1
EYE DISCHARGE: 0
EYE REDNESS: 0
DIZZINESS: 0
FACIAL SWELLING: 0
SORE THROAT: 0
POLYDIPSIA: 0
DIARRHEA: 0
SHORTNESS OF BREATH: 0
HEADACHES: 0
POLYPHAGIA: 0

## 2021-05-01 ASSESSMENT — COLUMBIA-SUICIDE SEVERITY RATING SCALE - C-SSRS
1. WITHIN THE PAST MONTH, HAVE YOU WISHED YOU WERE DEAD OR WISHED YOU COULD GO TO SLEEP AND NOT WAKE UP?: NO
6. HAVE YOU EVER DONE ANYTHING, STARTED TO DO ANYTHING, OR PREPARED TO DO ANYTHING TO END YOUR LIFE?: NO
2. HAVE YOU ACTUALLY HAD ANY THOUGHTS OF KILLING YOURSELF?: NO
IS THE PATIENT ABLE TO COMPLETE C-SSRS: YES

## 2021-05-01 ASSESSMENT — PATIENT HEALTH QUESTIONNAIRE - PHQ9
CLINICAL INTERPRETATION OF PHQ9 SCORE: NO FURTHER SCREENING NEEDED
IS PATIENT ABLE TO COMPLETE PHQ2 OR PHQ9: YES
SUM OF ALL RESPONSES TO PHQ9 QUESTIONS 1 AND 2: 0
1. LITTLE INTEREST OR PLEASURE IN DOING THINGS: NOT AT ALL
SUM OF ALL RESPONSES TO PHQ9 QUESTIONS 1 AND 2: 0
CLINICAL INTERPRETATION OF PHQ2 SCORE: NO FURTHER SCREENING NEEDED
2. FEELING DOWN, DEPRESSED OR HOPELESS: NOT AT ALL

## 2021-05-01 ASSESSMENT — ACTIVITIES OF DAILY LIVING (ADL)
ADL_BEFORE_ADMISSION: INDEPENDENT
ADL_SCORE: 12
ADL_SHORT_OF_BREATH: NO
CHRONIC_PAIN_PRESENT: NO
RECENT_DECLINE_ADL: YES, DECLINE IN AMBULATION/TRANSFERRING, COLLABORATE WITH PROVIDER (T)

## 2021-05-01 ASSESSMENT — COGNITIVE AND FUNCTIONAL STATUS - GENERAL
BECAUSE OF A PHYSICAL, MENTAL, OR EMOTIONAL CONDITION, DO YOU HAVE SERIOUS DIFFICULTY CONCENTRATING, REMEMBERING OR MAKING DECISIONS: YES
BECAUSE OF A PHYSICAL, MENTAL, OR EMOTIONAL CONDITION, DO YOU HAVE DIFFICULTY DOING ERRANDS ALONE: YES
DO YOU HAVE DIFFICULTY DRESSING OR BATHING: YES
DO YOU HAVE SERIOUS DIFFICULTY WALKING OR CLIMBING STAIRS: YES
ARE YOU BLIND OR DO YOU HAVE SERIOUS DIFFICULTY SEEING, EVEN WHEN WEARING GLASSES: NO
ARE YOU DEAF OR DO YOU HAVE SERIOUS DIFFICULTY  HEARING: YES

## 2021-05-01 ASSESSMENT — LIFESTYLE VARIABLES
AUDIT-C TOTAL SCORE: 0
HOW MANY STANDARD DRINKS CONTAINING ALCOHOL DO YOU HAVE ON A TYPICAL DAY: 0,1 OR 2
HOW OFTEN DO YOU HAVE 6 OR MORE DRINKS ON ONE OCCASION: NEVER
HOW OFTEN DO YOU HAVE A DRINK CONTAINING ALCOHOL: NEVER
ALCOHOL_USE_STATUS: NO OR LOW RISK WITH VALIDATED TOOL

## 2021-05-02 VITALS
SYSTOLIC BLOOD PRESSURE: 114 MMHG | TEMPERATURE: 97.5 F | RESPIRATION RATE: 16 BRPM | HEIGHT: 65 IN | HEART RATE: 81 BPM | DIASTOLIC BLOOD PRESSURE: 68 MMHG | OXYGEN SATURATION: 99 % | BODY MASS INDEX: 26.34 KG/M2 | WEIGHT: 158.07 LBS

## 2021-05-02 LAB
ALBUMIN SERPL-MCNC: 3 G/DL (ref 3.6–5.1)
ALBUMIN/GLOB SERPL: 1 {RATIO} (ref 1–2.4)
ALP SERPL-CCNC: 81 UNITS/L (ref 45–117)
ALT SERPL-CCNC: 25 UNITS/L
ANION GAP SERPL CALC-SCNC: 8 MMOL/L (ref 10–20)
APPEARANCE UR: CLEAR
AST SERPL-CCNC: 12 UNITS/L
ATRIAL RATE (BPM): 101
BASOPHILS # BLD: 0.1 K/MCL (ref 0–0.3)
BASOPHILS NFR BLD: 1 %
BILIRUB SERPL-MCNC: 0.4 MG/DL (ref 0.2–1)
BILIRUB UR QL STRIP: NEGATIVE
BUN SERPL-MCNC: 11 MG/DL (ref 6–20)
BUN/CREAT SERPL: 16 (ref 7–25)
CALCIUM SERPL-MCNC: 8.5 MG/DL (ref 8.4–10.2)
CHLORIDE SERPL-SCNC: 110 MMOL/L (ref 98–107)
CO2 SERPL-SCNC: 27 MMOL/L (ref 21–32)
COLOR UR: ABNORMAL
CREAT SERPL-MCNC: 0.7 MG/DL (ref 0.67–1.17)
DEPRECATED RDW RBC: 38.5 FL (ref 39–50)
EOSINOPHIL # BLD: 0.4 K/MCL (ref 0–0.5)
EOSINOPHIL NFR BLD: 5 %
ERYTHROCYTE [DISTWIDTH] IN BLOOD: 13.4 % (ref 11–15)
FASTING DURATION TIME PATIENT: ABNORMAL H
GFR SERPLBLD BASED ON 1.73 SQ M-ARVRAT: 90 ML/MIN/1.73M2
GLOBULIN SER-MCNC: 3 G/DL (ref 2–4)
GLUCOSE BLDC GLUCOMTR-MCNC: 209 MG/DL (ref 70–99)
GLUCOSE BLDC GLUCOMTR-MCNC: 261 MG/DL (ref 70–99)
GLUCOSE SERPL-MCNC: 218 MG/DL (ref 65–99)
GLUCOSE UR STRIP-MCNC: >=500 MG/DL
HCT VFR BLD CALC: 34.7 % (ref 39–51)
HGB BLD-MCNC: 11.4 G/DL (ref 13–17)
HGB UR QL STRIP: NEGATIVE
IMM GRANULOCYTES # BLD AUTO: 0 K/MCL (ref 0–0.2)
IMM GRANULOCYTES # BLD: 1 %
KETONES UR STRIP-MCNC: NEGATIVE MG/DL
LEUKOCYTE ESTERASE UR QL STRIP: NEGATIVE
LYMPHOCYTES # BLD: 2.3 K/MCL (ref 1–4)
LYMPHOCYTES NFR BLD: 26 %
MAGNESIUM SERPL-MCNC: 1.7 MG/DL (ref 1.7–2.4)
MCH RBC QN AUTO: 26.2 PG (ref 26–34)
MCHC RBC AUTO-ENTMCNC: 32.9 G/DL (ref 32–36.5)
MCV RBC AUTO: 79.8 FL (ref 78–100)
MONOCYTES # BLD: 0.9 K/MCL (ref 0.3–0.9)
MONOCYTES NFR BLD: 10 %
NEUTROPHILS # BLD: 4.9 K/MCL (ref 1.8–7.7)
NEUTROPHILS NFR BLD: 57 %
NITRITE UR QL STRIP: NEGATIVE
NRBC BLD MANUAL-RTO: 0 /100 WBC
P AXIS (DEGREES): 63
PH UR STRIP: 5 [PH] (ref 5–7)
PLATELET # BLD AUTO: 209 K/MCL (ref 140–450)
POTASSIUM SERPL-SCNC: 4.6 MMOL/L (ref 3.4–5.1)
PR-INTERVAL (MSEC): 124
PROT SERPL-MCNC: 6 G/DL (ref 6.4–8.2)
PROT UR STRIP-MCNC: NEGATIVE MG/DL
QRS-INTERVAL (MSEC): 86
QT-INTERVAL (MSEC): 346
QTC: 448
R AXIS (DEGREES): -24
RAINBOW EXTRA TUBES HOLD SPECIMEN: NORMAL
RAINBOW EXTRA TUBES HOLD SPECIMEN: NORMAL
RBC # BLD: 4.35 MIL/MCL (ref 4.5–5.9)
REPORT TEXT: NORMAL
SODIUM SERPL-SCNC: 140 MMOL/L (ref 135–145)
SP GR UR STRIP: 1.02 (ref 1–1.03)
T AXIS (DEGREES): 55
UROBILINOGEN UR STRIP-MCNC: 0.2 MG/DL
VENTRICULAR RATE EKG/MIN (BPM): 101
WBC # BLD: 8.6 K/MCL (ref 4.2–11)

## 2021-05-02 PROCEDURE — 85025 COMPLETE CBC W/AUTO DIFF WBC: CPT | Performed by: HOSPITALIST

## 2021-05-02 PROCEDURE — 80053 COMPREHEN METABOLIC PANEL: CPT | Performed by: HOSPITALIST

## 2021-05-02 PROCEDURE — 82962 GLUCOSE BLOOD TEST: CPT

## 2021-05-02 PROCEDURE — 96376 TX/PRO/DX INJ SAME DRUG ADON: CPT

## 2021-05-02 PROCEDURE — 97535 SELF CARE MNGMENT TRAINING: CPT

## 2021-05-02 PROCEDURE — 10002807 HB RX 258: Performed by: HOSPITALIST

## 2021-05-02 PROCEDURE — 10002800 HB RX 250 W HCPCS: Performed by: HOSPITALIST

## 2021-05-02 PROCEDURE — G0378 HOSPITAL OBSERVATION PER HR: HCPCS

## 2021-05-02 PROCEDURE — 97166 OT EVAL MOD COMPLEX 45 MIN: CPT

## 2021-05-02 PROCEDURE — 96372 THER/PROPH/DIAG INJ SC/IM: CPT

## 2021-05-02 PROCEDURE — 10004651 HB RX, NO CHARGE ITEM: Performed by: HOSPITALIST

## 2021-05-02 PROCEDURE — 97162 PT EVAL MOD COMPLEX 30 MIN: CPT

## 2021-05-02 PROCEDURE — 10002803 HB RX 637: Performed by: HOSPITALIST

## 2021-05-02 PROCEDURE — 36415 COLL VENOUS BLD VENIPUNCTURE: CPT | Performed by: HOSPITALIST

## 2021-05-02 PROCEDURE — 97530 THERAPEUTIC ACTIVITIES: CPT

## 2021-05-02 PROCEDURE — 81003 URINALYSIS AUTO W/O SCOPE: CPT | Performed by: HOSPITALIST

## 2021-05-02 PROCEDURE — 83735 ASSAY OF MAGNESIUM: CPT | Performed by: HOSPITALIST

## 2021-05-02 RX ORDER — NICOTINE POLACRILEX 4 MG
15 LOZENGE BUCCAL PRN
Status: DISCONTINUED | OUTPATIENT
Start: 2021-05-02 | End: 2021-05-02 | Stop reason: HOSPADM

## 2021-05-02 RX ORDER — DEXTROSE MONOHYDRATE 25 G/50ML
25 INJECTION, SOLUTION INTRAVENOUS PRN
Status: DISCONTINUED | OUTPATIENT
Start: 2021-05-02 | End: 2021-05-02 | Stop reason: HOSPADM

## 2021-05-02 RX ORDER — DEXTROSE MONOHYDRATE 25 G/50ML
12.5 INJECTION, SOLUTION INTRAVENOUS PRN
Status: DISCONTINUED | OUTPATIENT
Start: 2021-05-02 | End: 2021-05-02 | Stop reason: HOSPADM

## 2021-05-02 RX ORDER — LOSARTAN POTASSIUM 50 MG/1
50 TABLET ORAL DAILY
Status: DISCONTINUED | OUTPATIENT
Start: 2021-05-02 | End: 2021-05-02 | Stop reason: HOSPADM

## 2021-05-02 RX ORDER — PRAVASTATIN SODIUM 20 MG
20 TABLET ORAL NIGHTLY
Status: DISCONTINUED | OUTPATIENT
Start: 2021-05-02 | End: 2021-05-02 | Stop reason: HOSPADM

## 2021-05-02 RX ORDER — NICOTINE POLACRILEX 4 MG
30 LOZENGE BUCCAL PRN
Status: DISCONTINUED | OUTPATIENT
Start: 2021-05-02 | End: 2021-05-02 | Stop reason: HOSPADM

## 2021-05-02 RX ORDER — RIVASTIGMINE 4.6 MG/24H
1 PATCH, EXTENDED RELEASE TRANSDERMAL EVERY EVENING
Status: DISCONTINUED | OUTPATIENT
Start: 2021-05-02 | End: 2021-05-02 | Stop reason: HOSPADM

## 2021-05-02 RX ORDER — CEPHALEXIN 250 MG/1
250 CAPSULE ORAL 3 TIMES DAILY
Qty: 21 CAPSULE | Refills: 0 | Status: SHIPPED | COMMUNITY
Start: 2021-05-02

## 2021-05-02 RX ADMIN — HEPARIN SODIUM 5000 UNITS: 5000 INJECTION INTRAVENOUS; SUBCUTANEOUS at 07:03

## 2021-05-02 RX ADMIN — ASPIRIN 81 MG: 81 TABLET, COATED ORAL at 12:34

## 2021-05-02 RX ADMIN — CEFAZOLIN SODIUM 2000 MG: 300 INJECTION, POWDER, LYOPHILIZED, FOR SOLUTION INTRAVENOUS at 15:27

## 2021-05-02 RX ADMIN — INSULIN LISPRO 3 UNITS: 100 INJECTION, SOLUTION INTRAVENOUS; SUBCUTANEOUS at 12:36

## 2021-05-02 RX ADMIN — LEVODOPA AND CARBIDOPA 2 CAPSULE: 95; 23.75 CAPSULE, EXTENDED RELEASE ORAL at 12:35

## 2021-05-02 RX ADMIN — HEPARIN SODIUM 5000 UNITS: 5000 INJECTION INTRAVENOUS; SUBCUTANEOUS at 15:29

## 2021-05-02 RX ADMIN — LOSARTAN POTASSIUM 50 MG: 50 TABLET, FILM COATED ORAL at 12:34

## 2021-05-02 RX ADMIN — SODIUM CHLORIDE: 0.9 INJECTION, SOLUTION INTRAVENOUS at 01:08

## 2021-05-02 RX ADMIN — CEFAZOLIN SODIUM 2000 MG: 300 INJECTION, POWDER, LYOPHILIZED, FOR SOLUTION INTRAVENOUS at 07:03

## 2021-05-02 ASSESSMENT — COGNITIVE AND FUNCTIONAL STATUS - GENERAL
DO YOU HAVE SERIOUS DIFFICULTY WALKING OR CLIMBING STAIRS: YES
HELP NEEDED DRESSING REGULAR UPPER BODY CLOTHING: A LOT
REMEMBERING WHERE THINGS ARE: A LITTLE
DO YOU HAVE DIFFICULTY DRESSING OR BATHING: YES
APPLIED_COGNITIVE_RAW_SCORE: 18
HELP NEEDED DRESSING REGULAR LOWER BODY CLOTHING: A LOT
REMEMBERING TO TAKE MEDICATION: A LITTLE
BECAUSE OF A PHYSICAL, MENTAL, OR EMOTIONAL CONDITION, DO YOU HAVE DIFFICULTY DOING ERRANDS ALONE: YES
TAKING CARE OF COMPLICATED TASKS: A LOT
HELP NEEDED FOR PERSONAL GROOMING: A LOT
DAILY_ACTIVITY_RAW_SCORE: 12
BASIC_MOBILITY_RAW_SCORE: 6
REMEMBERING 5 ERRANDS WITH NO LIST: A LOT
HELP NEEDED FOR TOILETING: TOTAL
APPLIED_COGNITIVE_CONVERTED_SCORE: 38.07
HELP NEEDED FOR BATHING: A LOT
BASIC_MOBILITY_CONVERTED_SCORE: 16.59
BECAUSE OF A PHYSICAL, MENTAL, OR EMOTIONAL CONDITION, DO YOU HAVE SERIOUS DIFFICULTY CONCENTRATING, REMEMBERING OR MAKING DECISIONS: YES
DAILY_ACTIVITY_CONVERTED_SCORE: 30.60

## 2021-05-02 ASSESSMENT — ACTIVITIES OF DAILY LIVING (ADL)
EATING: INDEPENDENT
DRESSING YOURSELF: NEEDS ASSISTANCE
PRIOR_ADL_BATHING: TOTAL ASSIST (TOTAL)
FEEDING YOURSELF: INDEPENDENT
GROOMING: SET-UP
TRANSFERRING: NEEDS ASSISTANCE
PRIOR_ADL_TOILETING: MINIMAL ASSIST (MIN)
TOILETING: NEEDS ASSISTANCE
HOME_MANAGEMENT_TIME_ENTRY: 8
BATHING: NEEDS ASSISTANCE
ADL_SCORE: 6
CONTINENCE: DEPENDENT
ADL_BEFORE_ADMISSION: NEEDS/REQUIRES ASSISTANCE

## 2021-05-02 ASSESSMENT — PAIN SCALES - GENERAL
PAINLEVEL_OUTOF10: 0
PAINLEVEL_OUTOF10: 0

## 2021-05-02 ASSESSMENT — ENCOUNTER SYMPTOMS: PAIN SEVERITY NOW: 0

## 2021-05-06 LAB
BACTERIA BLD CULT: NORMAL
BACTERIA BLD CULT: NORMAL

## 2021-05-26 NOTE — PROGRESS NOTES
Deborah Naranjo  : 10/10/1940  Age 79year old  male patient is admitted to Facility: [facility] [address] Admit date:   21  Discharge date to Lexi  at [address] [place] [address] Road:   21  Discharge date to [facility] [address]   • OTHER SURGICAL HISTORY      pilondial cystectomy   • PATIENT DOCUMENTED NOT TO HAVE EXPERIENCED ANY OF THE FOLLOWING EVENTS N/A 5/5/2015    Procedure: COLONOSCOPY, POSSIBLE BIOPSY, POSSIBLE POLYPECTOMY 86324;  Surgeon: Torres Guzman ONETOUCH ULTRASOFT LANCETS Does not apply Misc TEST ONCE DAILY 100 each 3   • Blood Glucose Monitoring Suppl ([address]   [address]) w/Device Does not apply Kit Check blood sugar once a day 1 kit 0   • Glucose Blood (ONETOUCH ULTRA BLUE) In Vitro Per & Lizbeth normocephalic; normal nose, no nasal drainage, mucous membranes pink, moist, pharynx no exudate, no visible cerumen.   NECK: supple; FROM; no JVD, no TMG, no carotid bruits  BREAST: ---deferred  RESPIRATORY:clear to auscultation anteriorly and posteriorly; [  ]  Dialysis      Hospital score:     Attribute:  [ ]Points if positive:    Low hemoglobin at discharge (<12g/dl)                                [1]  Followed by Oncology service                                              [2]  Low sodi NARESH  Dr Morse/neurology at [facility] after NARESH    *Greater than 65 minutes spent w/ patient and family, reviewing medical records, labs, completing medication reconciliation and entering orders to establish plan of care in [facility].     Bernardo Soto, APRN  05/26/21   9

## 2021-05-31 PROBLEM — R41.841 COGNITIVE COMMUNICATION DEFICIT: Status: ACTIVE | Noted: 2021-01-01

## 2021-05-31 PROBLEM — F02.80 LATE ONSET ALZHEIMER'S DEMENTIA WITHOUT BEHAVIORAL DISTURBANCE (HCC): Status: ACTIVE | Noted: 2021-01-01

## 2021-05-31 PROBLEM — G30.1 LATE ONSET ALZHEIMER'S DEMENTIA WITHOUT BEHAVIORAL DISTURBANCE (HCC): Status: ACTIVE | Noted: 2021-01-01

## 2021-06-01 NOTE — PROGRESS NOTES
HPI/Subjective:   Asad Fontanez is a 79year old male who presents for Follow - Up (PD with generalized weakness and physical deconditioning  )   Presenting for follow up for transfer of care from [address] [place] [address].    Patient has history of worsening Negative. Skin: Negative. Neurological: Negative. Objective: There were no vitals taken for this visit. Estimated body mass index is 25.71 kg/m² as calculated from the following:    Height as of 11/4/19: 5' 4\" (1.626 m).     Weight as of 1

## 2021-06-08 PROBLEM — F02.80 DEMENTIA DUE TO PARKINSON'S DISEASE WITHOUT BEHAVIORAL DISTURBANCE (HCC): Status: ACTIVE | Noted: 2018-03-28

## 2021-06-08 PROBLEM — G20 DEMENTIA DUE TO PARKINSON'S DISEASE WITHOUT BEHAVIORAL DISTURBANCE (HCC): Status: ACTIVE | Noted: 2018-03-28

## 2021-06-09 PROBLEM — R53.81 PHYSICAL DECONDITIONING: Status: ACTIVE | Noted: 2021-01-01

## 2021-06-09 PROBLEM — R53.1 GENERALIZED WEAKNESS: Status: ACTIVE | Noted: 2021-01-01

## 2021-06-09 NOTE — PROGRESS NOTES
HPI/Subjective:   Mallorie Delgado is a 79year old male who presents for No chief complaint on file. Presenting for follow up for transfer of care from [address] [place] [address].    Patient has history of worsening PD and Dementia with decreased lower extremit There were no vitals taken for this visit. Estimated body mass index is 25.71 kg/m² as calculated from the following:    Height as of 11/4/19: 5' 4\" (1.626 m). Weight as of 10/13/20: 149 lb 12.8 oz (67.9 kg).   Physical Exam  Constitutional:       Leonora

## 2021-07-13 PROBLEM — G30.1 LATE ONSET ALZHEIMER'S DEMENTIA WITHOUT BEHAVIORAL DISTURBANCE (HCC): Status: RESOLVED | Noted: 2021-01-01 | Resolved: 2021-01-01

## 2021-07-13 PROBLEM — F02.80 LATE ONSET ALZHEIMER'S DEMENTIA WITHOUT BEHAVIORAL DISTURBANCE (HCC): Status: RESOLVED | Noted: 2021-01-01 | Resolved: 2021-01-01

## 2021-09-27 PROBLEM — R73.9 HYPERGLYCEMIA: Status: ACTIVE | Noted: 2021-01-01

## 2021-09-27 PROBLEM — N17.9 ACUTE KIDNEY INJURY (HCC): Status: ACTIVE | Noted: 2021-01-01

## 2021-09-27 PROBLEM — R65.21 SEPTIC SHOCK (HCC): Status: ACTIVE | Noted: 2021-01-01

## 2021-09-27 PROBLEM — D72.829 LEUKOCYTOSIS: Status: ACTIVE | Noted: 2021-01-01

## 2021-09-27 PROBLEM — R79.89 AZOTEMIA: Status: ACTIVE | Noted: 2021-01-01

## 2021-09-27 PROBLEM — A41.9 SEPTIC SHOCK (HCC): Status: ACTIVE | Noted: 2021-01-01

## 2021-09-27 PROBLEM — E87.0 HYPERNATREMIA: Status: ACTIVE | Noted: 2021-01-01

## 2021-09-27 PROBLEM — N17.9 ACUTE RENAL FAILURE (ARF) (HCC): Status: ACTIVE | Noted: 2021-01-01

## 2021-09-27 NOTE — ED PROVIDER NOTES
Patient Seen in: [facility] Emergency Department      History   Patient presents with:  Altered Mental Status  Hyperglycemia    Stated Complaint: DKA, AMS    Subjective:   HPI    Patient is a 69-year-old male presents to emergency room for evaluatio TO HAVE EXPERIENCED ANY OF THE FOLLOWING EVENTS N/A 5/5/2015    Procedure: COLONOSCOPY, POSSIBLE BIOPSY, POSSIBLE POLYPECTOMY 87681;  Surgeon: Kalie Danielle MD;  Location: [address] [place] [address]   • PATIENT [place] PREOPERATIVE ORDER FOR IV ANTIBIOTIC this time    ED Course     Labs Reviewed   COMP METABOLIC PANEL (14) - Abnormal; Notable for the following components:       Result Value    Glucose 522 (*)     Sodium 154 (*)     Chloride 118 (*)     BUN 48 (*)     Creatinine 1.68 (*)     Calculated Osmol created for panel order CBC With Differential With Platelet.   Procedure                               Abnormality         Status                     ---------                               -----------         ------                     CBC W/ DIFFERENTIAL[ Mild age-indeterminate microvascular ischemic changes in the cerebral white matter. If there is clinical concern for acute ischemia/infarction, an MRI of the brain would be recommended for further evaluation.   3. Mild-to-moderate global brain parenchymal v material. Multi-planar reformatted/3-D images were created to optimize visualization of vascular anatomy. Dose reduction techniques were used.  Dose information is transmitted to the [facility] ([facility]) Val. Chaka Tan  ([facility] thickening is concerning for cystitis. Clinical correlation recommended. Prostate calcifications and mild prostate enlargement. Pelvic phleboliths. LYMPH NODES:  No lymphadenopathy in the abdomen or pelvis.  BONES:  Degenerative changes in the spine and mL (0 mL Intravenous Stopped 9/27/21 1822)   Piperacillin Sod-Tazobactam So (ZOSYN) 3.375 g in dextrose 5% 100 mL IVPB-ADDV (0 g Intravenous Stopped 9/27/21 1851)   iohexol (OMNIPAQUE) 350 MG/ML injection 100 mL (100 mL Intravenous Given 9/27/21 1944) sterile fashion. Full gown, And gloves were used. Right subclavian vein was cannulated. Seldinger technique was used to insert triple-lumen catheter. All ports were flushed and returned blood. Flushed with saline. Caps were placed.   Biopatch was plac [facility]) N17.9 9/27/2021 Yes    Acute renal failure (ARF) (HCC) N17.9 9/27/2021 Yes    Azotemia R79.89 9/27/2021 Yes    Hyperglycemia R73.9 9/27/2021 Yes    Hypernatremia E87.0 9/27/2021 Yes    Leukocytosis D72.829 9/27/2021 Yes

## 2021-09-28 NOTE — H&P
[facility] HOSPITALIST  History and Physical     Blima Day Patient Status:  Inpatient    10/10/1940 MRN [id number]   [facility] 4SW-A Attending Daniel Adame MD   Hosp Day # 0 PCP Selena Stewart MD     Chief Complaint: AMS    Histo BIOPSY, POSSIBLE POLYPECTOMY 49198;  Surgeon: Yanelis Shahid MD;  Location: [address] [place] [address]   • OTHER SURGICAL HISTORY      pilondial cystectomy   • PATIENT DOCUMENTED NOT TO HAVE EXPERIENCED ANY OF THE FOLLOWING EVENTS N/A 5/5/2015    Procedure: Disp: , Rfl:   ONETOUCH ULTRASOFT LANCETS Does not apply Misc, TEST ONCE DAILY, Disp: 100 each, Rfl: 3  Blood Glucose Monitoring Suppl ([address]   [address]) w/Device Does not apply Kit, Check blood sugar once a day, Disp: 1 kit, Rfl: 0  Glucose Blood (ON Estimated Creatinine Clearance: 31 mL/min (A) (based on SCr of 1.68 mg/dL (H)). No results for input(s): PTP, INR in the last 168 hours.     COVID-19 Lab Results    COVID-19  Lab Results   Component Value Date    COVID19 Not Detected 09/27/2021

## 2021-09-28 NOTE — CM/SW NOTE
Pt admitted from [facility]. AIDIN updates started to [facility]. Will need to confirm return there when appropriate, pt currently in ICU. sw following.

## 2021-09-28 NOTE — PLAN OF CARE
Assumed care of patient this am at shift change. Pt alert/eyes open. Incomprehensible speech/moaning/groaning sounds. Unable to follow commands. Moves all extremities, very stiff/contracted in all extremities. BP stable.   Tolerating 2L O2 per nasal ca

## 2021-09-28 NOTE — PROGRESS NOTES
ICU  Critical Care APRN Progress Note    NAME: Mallorie Delgado - ROOM: [address] [place], [place] [address] - MRN: [id number] - Age: 79year old - :10/10/1940    History Of Present Illness:  Mallorie Delgado is a 79year old male with PMHx significant for htn, parkinson's date: 1/1/1996    Alcohol use: No      Alcohol/week: 0.0 standard drinks    Drug use: No      Family Hx:  Family History   Problem Relation Age of Onset   • Diabetes Father    • Heart Disorder Father    • Hypertension Father    • Diabetes Mother    • [place] Finalized by (CST): Isabella Hansen MD on 9/27/2021 at 8:00 PM       XR CHEST AP PORTABLE  (CPT=71045)    Result Date: 9/27/2021  CONCLUSION:  Right central line tip in the SVC.     Dictated by (CST): Isabella Hansen MD on 9/27/2021 at 10:04 PM     Finalize 9/27/2021 at 8:28 PM         Labs:  Lab Results   Component Value Date    WBC 16.8 09/27/2021    HGB 14.1 09/27/2021    HCT 45.9 09/27/2021    .0 09/27/2021    CREATSERUM 1.68 09/27/2021    BUN 48 09/27/2021     09/27/2021    K 4.1 09/27/2021

## 2021-09-28 NOTE — PLAN OF CARE
Received pt from ER at 2220. Pt alert, unable to follow commands, incomprehensible speech. Withdraws from painful stimuli. On 2L nasal cannula with diminished breath sounds. Afebrile. Blood pressure stable. Sinus tachycardia.  SCD's placed for DVT prophylax

## 2021-09-28 NOTE — CONSULTS
[facility]  Report of Inpatient Wound Care Consultation    Renee Salter Patient Status:  Inpatient    10/10/1940 MRN [id number]   [facility] 4SW-A Attending Anurag Quarles, [address] [facility] [address] Day # 1 PCP Ne Umanzor MD     Reason for Co POLYPECTOMY 09593;  Surgeon: Yanelis Shahid MD;  Location: [address] [place] [address]   • PATIENT [address] [place] [address] FOR IV ANTIBIOTIC SURGICAL SITE INFECTION PROPHYLAXIS.  N/A 5/5/2015    Procedure: COLONOSCOPY, POSSIBLE BIOPSY, POSSIBLE POLYPECTOM on 9/27/21 by MD.      Based on assessment, recommend use of Collagenase Santyl daily with Sacral Border Foam over. Talked to RN and ordered as such. Thank you for this consultation and for allowing me to participate in the care of your patient.   Ple

## 2021-09-28 NOTE — CONSULTS
Nabeel Leiva [address] [facility]  Pulmonary/Critical Care Consult Note  [facility]  Report of Consultation    Jamal Benites Patient Status:  Inpatient    10/10/1940 MRN [id number]   [facility] 4SW-A Attending Carissa Mendoza COLONOSCOPY,BIOPSY N/A 5/5/2015    Procedure: COLONOSCOPY, POSSIBLE BIOPSY, POSSIBLE POLYPECTOMY 20334;  Surgeon: Delia Siddiqi MD;  Location: [address] [place] [address]   • OTHER SURGICAL HISTORY      pilondial cystectomy   • PATIENT DOCUMENTED NOT TO HAV past 24 hrs:   BP Temp Temp src Pulse Resp SpO2 Height Weight   09/28/21 0700 100/60 — — 104 21 — — —   09/28/21 0600 115/56 — — 104 22 100 % — —   09/28/21 0500 100/68 — — 108 21 90 % — —   09/28/21 0400 150/55 98.1 °F (36.7 °C) Temporal 108 22 96 % — — voice, confused, moans/yells with tactile stimuli. Not following commands.  No distress at rest  PSYCH: confused  NEURO: symmetric facies, pupils 2mm and not reactive, moans and yells to tactile stimuli, moving all extremities purposefully but not to comman LDH, CK, DDIMER in the last 168 hours. No results for input(s): TROP, PBNP in the last 168 hours. ABG:     No results for input(s): ABGPHT, PMKJEZ5W, IMYBM5D, ABGHCO3, ABGBE, TEMP, FERNANDA, SITE, DEV, THGB in the last 168 hours.     Invalid input(s): by (CST): Jason Ness MD on 9/27/2021 at 5:35 PM       CTA CHEST + CT ABD (W) + CT PEL (W) SH(CPT=71275/90823)    Result Date: 9/27/2021  CONCLUSION:  Suboptimal exam due to extensive patient motion. 1. Extensive patient respiratory motion noted.   No empiric abx, azith day 2, zosyn day 1; await cx results; rapid COVID negative, alinity pending.  Send strep/legionella antigen  Kurt Pore insulin gtt given resolution of hypergylcemia and anion gap, transition to subcutaneous insulin  On d5 1/2NS - increase to 1

## 2021-09-28 NOTE — CONSULTS
[address]  [id number]  Hospital Day #1  Date of Consult:      9/28/2021    Reason for Consultation:      Consult requested by Claudean Cruz, APRN for evaluation of palliative care needs, Laterality Date   • COLONOSCOPY  10/8/04   • COLONOSCOPY     • COLONOSCOPY,BIOPSY  2/25/10    Performed by Shilpa Llamas at [address] N/A 5/5/2015    Procedure: COLONOSCOPY, POSSIBLE BIOPSY, POSSIBLE POLYPECTOMY 5059 Intravenous, Continuous PRN  •  potassium chloride IVPB premix 40 mEq, 40 mEq, Intravenous, Once **AND** potassium chloride IVPB premix 20 mEq, 20 mEq, Intravenous, Once  •  rivastigmine (EXELON) 4.6 MG/24HR patch 1 patch, 1 patch, Transdermal, Daily  •  i 09/28/2021    K 2.8 (LL) 09/28/2021    K 2.8 (LL) 09/28/2021     (H) 09/28/2021     (H) 09/28/2021    CO2 29.0 09/28/2021    CO2 29.0 09/28/2021     (H) 09/28/2021     (H) 09/28/2021    CA 8.7 09/28/2021    CA 8.7 09/28/2021    AL reduction   techniques were used. Dose information is transmitted to the [facility]) NRDR ([address]) which includes the Dose Index Registry.      PATIENT STATED HISTORY:(As transcribed by Technologist)  AMS calcifications and mild prostate enlargement. Pelvic phleboliths. LYMPH NODES:  No lymphadenopathy in the abdomen or pelvis. BONES:  Degenerative changes in the spine and hips. OTHER:  None.                       Impression: CONCLUSION:  Suboptimal exa slightly disproportionate prominence of the ventricular system raising the possibility of underlying normal pressure hydrocephalus. Clinical correlation recommended. There is no midline shift. The basal cisterns are patent.   The gray-white matter di Degenerative changes in the spine. Impression: CONCLUSION:  Patchy opacity in the retrocardiac left lower lobe is concerning for an area of developing pneumonia, with atelectasis/scarring or other etiologies not excluded.   Clinical correl with the wife, Bharath Robbins,  in the lobby to discuss the patient's goals of care. I introduced palliative care and reason for consultation.  I discussed the benefits of palliative care to include assistance with arising symptom management needs, extra layer of sup current status in health. After the discussion with the family, the patient is DNAR with selective treatment. Hopes/Goals: The patient will transfer back to [place] [address] once medically stable. Concerns/Fears: The COVID-19 infection.   MsReyes Jimenezsarika Tiffany stated this anesthesia or procedural complication, diarrhea, nausea and vomiting.      Benefits of PEG placement rather then feeding orally.  -Provides hope for caregiver/ family for future improvement  -Enables family to avoid guilt/ conflict with choosing other treat care: Continue medical management with new limitations to aggressive care, No CPR and no Intubation. 2. CODE STATUS:  DNAR with selective treatment. 3. POLST: Signed and scanned in the chart.    - Healthcare POA / HC surrogate: Maik Baer (Wife)  -

## 2021-09-28 NOTE — SLP NOTE
SLP order received to evaluate oropharyngeal swallow. Discussed with RN, patient lethargic and not appropriate for SLP evaluation at this time. Will continue to monitor and evaluate when medically appropriate.

## 2021-09-29 NOTE — DIETARY NOTE
[facility]  NUTRITION ASSESSMENT    Pt does not meet malnutrition criteria. NUTRITION INTERVENTION:    1. Meal and Snacks - Advance diet as tolerated per SLP to goal of Carb Controlled 1800kcal/60g CHO; monitor patient po intake.  Encourage adequate kg    WEIGHT HISTORY:   Patient Weight(s) for the past 336 hrs:   Weight   09/27/21 2220 48.6 kg (107 lb 2.3 oz)   09/27/21 1652 68 kg (150 lb)       Wt Readings from Last 10 Encounters:  09/27/21 : 48.6 kg (107 lb 2.3 oz)  10/13/20 : 67.9 kg (149 lb 12.8

## 2021-09-29 NOTE — PROGRESS NOTES
[facility]     Hospitalist Progress Note     Donna Mckeon Patient Status:  Inpatient    10/10/1940 MRN [id number]   [facility] 4SW-A Attending Kelsey Witt, [address] [facility] Day # 2 PCP Yohana Dewey MD     Chief Complaint: AMS    Sub 153*   K 4.1   < > 3.1*   < > 2.8*  2.8*  --  3.5  --   --  3.1*  --    *   < > 126*  --  126*  126*  --   --   --   --  123*  --    CO2 22.0   < > 26.0  --  29.0  29.0  --   --   --   --  24.0  --    ALKPHO 74  --   --   --  79  --   --   --   --  7 proctitis  o Completing Zithro  o Cont Zosyn   o ICU following   • HHS/DMII- Resolved  o Increase Levemir/SSI with adjusting IVF   • SUGAR- Improved with IVF  • Hypernatremia- stable after last few draws  o Adjust IVF to D5 and cont Q6H Na+ levels   • Acute

## 2021-09-29 NOTE — PROGRESS NOTES
[facility] Pulmonary/Critical Care Progress Note     SUBJECTIVE/Interval history: All events, procedures, notes reviewed. Pt is responding. Comfortable.      Review of Systems:   A comprehensive 14 point review of system CREATSERUM 1.01  --  0.67*  0.67*  --   --   --   --  0.56*   GFRAA 81  --  106  106  --   --   --   --  114   GFRNAA 70  --  91  91  --   --   --   --  98   CA 8.7  --  8.7  8.7  --   --   --   --  8.5   *   < > 158*  158*   < >  --  153* 153* 153 CT BRAIN OR HEAD (49817)    Result Date: 9/27/2021  CONCLUSION:   1. No acute intracranial abnormality identified. 2. Mild age-indeterminate microvascular ischemic changes in the cerebral white matter.  If there is clinical concern for acute ischemia/inf central pulmonary embolism identified. Segmental or subsegmental emboli cannot be excluded on the basis of this exam.  Clinical correlation recommended. V/Q scan may be of further value. No CT evidence of acute aortic dissection.   2. There are patchy ar cystitis  · Altered mental status likely toxic metabolic due to above  · Lactic acidosis due to above, improved  · Acute renal failure likely hypovolemic and sepsis  · Hyperosmolar hyperglycemic state due to above  · Leukocytosis due to above  · Hypernatre

## 2021-09-29 NOTE — PHYSICAL THERAPY NOTE
Order received for PT eval per functional mobility screen and chart reviewed. Pt is a LTC resident at [facility]. Per Palliative Care note, patient requires sujit lift for OOB mobility at baseline. No skilled PT warranted. PT will sign off.

## 2021-09-29 NOTE — SLP NOTE
ADULT SWALLOWING EVALUATION    ASSESSMENT    ASSESSMENT/OVERALL IMPRESSION:  Madeline Mccarty is a 79year old male with past medical history of dementia, type 2 diabetes mellitus is brought from the nursing home with altered mental status.  He was on a instability 3/28/2018   • Hearing impairment    • History of stomach ulcers    • Hypertrophy of prostate without urinary obstruction and other lower urinary tract symptoms (LUTS) 12/29/2008   • Late onset Alzheimer's dementia without behavioral disturbance Unable to assess  Strength: Unable to assess  Tone: Unable to assess  Range of Motion: Unable to assess  Rate of Motion: Unable to assess    Voice Quality: Weak  Respiratory Status: Supplemental O2; Nasal cannula  Consistencies Trialed: Nectar thick liquid

## 2021-09-29 NOTE — PLAN OF CARE
Received pt alert, unable to follow commands, incomprehensible speech. Withdraws from painful stimuli. On 2L nasal cannula with diminished breath sounds. Afebrile. Blood pressure stable. Sinus tachycardia. SCD's in place for DVT prophylaxis. No BM.  Male ex

## 2021-09-29 NOTE — PLAN OF CARE
Upon initial assessment, pt resting in bed on nasal cannula, disoriented x4, withdraws from painful stim. Pt has non-productive, congested, weak cough. He is NPO, failed speech eval. Male external cath in place and draining adequately.  Pressure wound with

## 2021-09-30 NOTE — PROGRESS NOTES
Problem: ams    Data: Alert at times, sleepy throughout shift, responds to verbal and tactile stimuli. DHT inserted today. TF initiated per nutritionist recs- see note. Briefed and incontinent. EP- K replaced today. NPO.      Action: IVF, IV abx

## 2021-09-30 NOTE — OCCUPATIONAL THERAPY NOTE
Order received for OT eval per functional mobility screen and chart reviewed. Pt is a LTC resident at [facility]. Per Palliative Care note, patient requires sujit lift for OOB mobility and assist for all ADLs at baseline. No skilled OT warranted.

## 2021-09-30 NOTE — PROGRESS NOTES
[facility]     Hospitalist Progress Note     Adria Arvizu Patient Status:  Inpatient    10/10/1940 MRN [id number]   [facility] 4SW-A Attending Miya Keller, [address] [facility] Day # 3 PCP Ivory Romo MD     Chief Complaint: AMS    Sub 29.0  --  24.0  --   --   --  24.0   ALKPHO 79  --  78  --   --   --  86   AST 44*  --  52*  --   --   --  21   ALT 22  --  52  --   --   --  39   BILT 0.6  --  0.6  --   --   --  0.6   TP 6.5  --  6.2*  --   --   --  5.7*    < > = values in this interval dementia/Parkinsn's disease   • Hypokalemia replace/ monitor   • Stage 2 sacral decubiti POA  drsg , re position q2 hrs  Skin care   • Malnutrition  Albumin 1.5  Failed swallow eval Wed  ?  NG spoke w/ wife she is agreeable to TF  - placement to start feedi

## 2021-09-30 NOTE — PLAN OF CARE
Received pt alert, unable to follow commands, incomprehensible speech. Withdraws from painful stimuli. On 4L nasal cannula with diminished breath sounds. Afebrile. Blood pressure stable. Sinus tachycardia. SCD's in place for DVT prophylaxis. BMx1.  Male ext

## 2021-09-30 NOTE — SLP NOTE
Patient having Dobhoff placed today. He is demonstrating improved alertness per RN however sleeps much of the time.   Will continue to follow patient for bedside swallowing evaluation and follow up on 10/1 pending alertness and ability to participate in a s

## 2021-10-01 NOTE — PLAN OF CARE
Pt lethargic, awakens to verbal and tactile stimuli.  on RA maintaining spO2 > 90%, baseline 2L. NSR-ST on tele. VSS. Afebrile. Pt does not appear to be in pain. Incontinent, briefed with male purewick, voiding.  TF titrated to goal rate of 50ml/hr, tole respiratory status  - Assess for changes in mentation and behavior  - Position to facilitate oxygenation and minimize respiratory effort  - Oxygen supplementation based on oxygen saturation or ABGs  - Provide Smoking Cessation handout, if applicable  - Enc limitations  - Instruct pt to call for assistance with activity based on assessment  - Modify environment to reduce risk of injury  - Provide assistive devices as appropriate  - Consider OT/PT consult to assist with strengthening/mobility  - Encourage toil

## 2021-10-01 NOTE — PLAN OF CARE
Assumed care for this pt at 2330. Pt lethargic withdrew from pain, as night progressed pt alert with eyes open. TF titrations continue per dobhoff jevity 1.5. triple lumen picc in right subclavian flush and draws blood. ivf continue per orders.  Sinus tach

## 2021-10-01 NOTE — DIETARY NOTE
[facility]  NUTRITION ASSESSMENT    Pt does not meet malnutrition criteria. NUTRITION INTERVENTION:  1. Meal and Snacks - Advance diet as tolerated per SLP to goal of Carb Controlled 1800kcal/60g CHO; monitor patient po intake.  Encourage adequate p pt meets criteria. ANTHROPOMETRICS:  Ht: 165.1 cm (5' 5\")  Wt: 51.8 kg (114 lb 3.2 oz). This is 92% of IBW  BMI: Body mass index is 19 kg/m².   IBW: 56.8 kg    WEIGHT HISTORY:   Patient Weight(s) for the past 336 hrs:   Weight   09/29/21 1600 51.8 kg (1 Thiamine, IVF: D5-NS    LABS: K+ 2.9, Na++ 148, A1c 7.1% (9/27)     Pt is at Moderate nutrition risk    FOLLOW-UP DATE: 10/5/2021    Isabella Bueno MS, RDN, LDN  Clinical Dietitian  Pager #[phone]

## 2021-10-01 NOTE — PROGRESS NOTES
Assumed patient care @1900 to 2300-transferred care to CRT RN  Patient is lethargic, is disoriented x4-opens eyes to voice, CALOS  On 3L O2, baseline 2L O2, vss, afebrile  ST on tele  Last BM 9/30 (incontinent of stool and urine-primofit in place)  Total lif

## 2021-10-01 NOTE — PROGRESS NOTES
[facility]     Hospitalist Progress Note     Edwige Anaya Patient Status:  Inpatient    10/10/1940 MRN [id number]   [facility] 4SW-A Attending Alfred Hinojosa, [address] [facility] Day # 4 PCP Alexandro Ramirez MD     Chief Complaint: AMS    Sub --   --  2.9* 2.9*   *  --  118*  --   --   --   --  119*  --    CO2 24.0  --  24.0  --   --   --   --  24.0  --    ALKPHO 78  --  86  --   --   --   --  92  --    AST 52*  --  21  --   --   --   --  33  --    ALT 52  --  39  --   --   --   --  33  - ongoing  Increase levemir sl   • SUGAR- Improved with IVF  Improved   • Hypernatremia-  Mildly elevated stable   o Na 148   o Cont monitoring    • Acute encephalopathy suspect metabolic from infection with Dementia as baseline   • Advanced dementia/Parkinsn'

## 2021-10-01 NOTE — PROGRESS NOTES
[facility] Pulmonary/Critical Care Progress Note     SUBJECTIVE/Interval history:  No acute events overnight, weaned to RA. No fevers.     OBJECTIVE:   09/30/21  2056 10/01/21  0000 10/01/21  0540 10/01/21  0726   BP: 130 --  24.0  --   --   --  24.0    < > = values in this interval not displayed.      Recent Labs   Lab 09/29/21  0524 09/30/21  0602 10/01/21  0512   RBC 4.50 3.88 3.61*   HGB 12.1* 10.1* 9.7*   HCT 38.2* 33.2* 30.1*   MCV 84.9 85.6 83.4   MCH 26.9 26.0 26.9 minerals  1 tablet Oral Daily   • folic acid  1 mg Oral Daily   • thiamine  100 mg Oral Daily   • insulin detemir  8 Units Subcutaneous Daily   • rivastigmine  1 patch Transdermal Daily   • collagenase   Topical Daily   • Insulin Aspart Pen  1-10 Units Sub

## 2021-10-01 NOTE — SLP NOTE
Contacted patient at bedside. He aroused easily to his name however could not sustain wakefulness to participate in a bedside swallowing evaluation despite tactile and verbal cues.    RECOMMEND:  NPO  ORAL HYGIENE 3x/day    Cata Joiner MA, CCC-SLP  Luis

## 2021-10-02 NOTE — PROGRESS NOTES
[facility]     Hospitalist Progress Note     Harshad Esposito Patient Status:  Inpatient    10/10/1940 MRN [id number]   [facility] 4SW-A Attending Elo Otoole, [address] [facility] Day # 5 PCP Junita Duane, MD     Chief Complaint: AMS    Sub 148*   < > 149* 148* 147*  147*   K 3.1*   < > 3.1*   < > 2.9*   < > 2.9*  --   --  3.1* 3.1*   *  --  118*  --  119*  --   --   --   --   --  118*   CO2 24.0  --  24.0  --  24.0  --   --   --   --   --  25.0   ALKPHO 78  --  86  --  92  --   --   -- Resolved  o Increase Levemir/SSI  o Stop D5 0.45NS and onitor BG and Na+  • SUGAR- Improved with IVF  Improved   • Hypernatremia-  Mildly elevated stable   o Na 147.  Cont monitoring    • Acute encephalopathy suspect metabolic from infection with Dementia as

## 2021-10-02 NOTE — DIETARY NOTE
[facility]  NUTRITION ASSESSMENT    Pt does not meet malnutrition criteria. NUTRITION INTERVENTION:  1. Meal and Snacks - Advance diet as tolerated per SLP to goal of Carb Controlled 1800kcal/60g CHO; monitor patient po intake.  Encourage adequate p Wound care consulted and documented b/l gluteal/sacral unstageable pressure ulcer. Will provide TF recommendations if DHT placed (per palliative care note yesterday, pt's wife agreeable to alternative nutrition at this time).  Although unable to confirm mal intake related to decreased intake as evidenced by documented/reported unintentional weight loss    MONITOR AND EVALUATE/NUTRITION GOALS:  1. Weight stable within 1 to 2 lbs during admission - New  2. Return to PO intake in 24-48 hrs - Resolved  3.  Start a

## 2021-10-02 NOTE — SLP NOTE
SPEECH DAILY NOTE - INPATIENT    ASSESSMENT & PLAN   ASSESSMENT  Pt seen this AM for reassessment of swallow at bedside. RN approved session. RN reported family discussing PEG vs hospice.  Hearing aids present at bedside placed, which improved pt's ability It should be noted, given progressive disease of dementia, anticipate poor prognosis for swallow function.  Pt is not appropriate for speech therapy pharyngeal strengthening given inability to follow complex commands and to be active participant in sess

## 2021-10-02 NOTE — PROGRESS NOTES
[facility]  Progress Note    Graciela Cabrera Patient Status:  Inpatient    10/10/1940 MRN [id number]   [facility] 5NW-A Attending Criss Feng MD   [facility] Day # 5 PCP Cleve Harmon MD     Subjective:  Graciela Cabrera is a(n) 3. 1* 3.1*   *  --   --   --  119*  --   --   --  118*   CO2 24.0  --   --   --  24.0  --   --   --  25.0   BUN 13  --   --   --  10  --   --   --  7   CREATSERUM 0.41*  --   --   --  0.33*  --   --   --  0.39*    < > = values in this interval not di failure likely hypovolemic and sepsis  · Hyperosmolar hyperglycemic state due to above  · Leukocytosis due to above  · Hypernatremia, hypovolemic  · Hypokalemia  · Dementia, parkinsons, h/o CVA  · Severe malnutrition dysphagia suspected aspiration  ·     P

## 2021-10-02 NOTE — CM/SW NOTE
Spoke with RN earlier that family was interested in hearing about hospice care. Received MD order for hospice. [facility] She will send referral in Aidin and follow up w/ family. Order acknowledged.       & C

## 2021-10-02 NOTE — HOSPICE RN NOTE
Hospice referral received from [address] [place] [address]. Writer sent Aidin referral and attempted to reach patients spouse. VM left asking for her to call me back to set up an informational meeting.      Roseann Velasquez RN  [facility]

## 2021-10-02 NOTE — HOSPICE RN NOTE
Writer was able to reach family,they had just left the hospital, they will meet with me tomorrow at 10am for a hospice informational meeting.

## 2021-10-02 NOTE — PLAN OF CARE
Received patient alert, unable to assess orientation d/t language barrier. O2 sats on RA 96%. Appears comfortable. Tube feeds at goal and patient is tolerating well.     Problem: Patient/Family Goals  Goal: Patient/Family Long Term Goal  Description: Ann saturation or ABGs  - Provide Smoking Cessation handout, if applicable  - Encourage broncho-pulmonary hygiene including cough, deep breathe, Incentive Spirometry  - Assess the need for suctioning and perform as needed  - Assess and instruct to report SOB o Consider OT/PT consult to assist with strengthening/mobility  - Encourage toileting schedule  Outcome: Progressing     Problem: DISCHARGE PLANNING  Goal: Discharge to home or other facility with appropriate resources  Description: INTERVENTIONS:  - Identif

## 2021-10-02 NOTE — PROGRESS NOTES
10/02/21 1824   Provider Notification   Reason for Communication Other (comment)  (Increased RUE pitting edema, hand cool to touch)   Provider Name Carl Sherwood MD   Method of Communication Page   Response At bedside;  Other (Comment)  (Dr. Raad Lu in

## 2021-10-03 NOTE — PROGRESS NOTES
[facility]  Progress Note    Jamal Benites Patient Status:  Inpatient    10/10/1940 MRN [id number]   [facility] 5NW-A Attending Trupti Lainez MD   [facility] Day # 6 PCP Stephanie Drake MD     Subjective:  Jamal Benites is a(n) 147*  147*   < > 148*   < > 148*   < > 147*  147* 145 144 143 141  141   K 2.9*  --  2.9*  --  3.1*  --  3.1*  --   --   --  3.2*   *  --   --   --   --   --  118*  --   --   --  110   CO2 24.0  --   --   --   --   --  25.0  --   --   --  27.0   BUN (posterior vitreous detachment), left     Diabetes mellitus type 2 without retinopathy ([address]     Combined forms of age-related cataract of both eyes     Mass of parotid gland     Dementia due to Parkinson's disease without behavioral disturbance ([address]

## 2021-10-03 NOTE — HOSPICE RN NOTE
Hospice information given, family leaning toward G-tube placement pending GI consult. Even with G-tube patient appears as though he would be hospice appropriate due to significant declines over the past 5 mo.  (28# wt loss, albumin 1.3, loss of ability to a

## 2021-10-03 NOTE — PROGRESS NOTES
[facility]     Hospitalist Progress Note     Demetrio Bowman Patient Status:  Inpatient    10/10/1940 MRN [id number]   [facility] 4SW-A Attending Jf Siddiqui, [address] [facility] Day # 6 PCP Jacinda Bean MD     Chief Complaint: AMS    Sub --  113   CA 7.7*  --  7.9*  --   --   --  8.0*  --   --   --  8.4*   ALB 1.5*  --  1.4*  --   --   --   --   --   --   --  1.3*   *  147*   < > 147*  147*   < > 148*   < > 147*  147*   < > 144 143 141  141   K 3.1*   < > 2.9*   < > 3.1*  --  3.1* Units Subcutaneous Q8H [facility]     Assessment & Plan:    • Sepsis likely secondary to pneumonia- lower suspicion for cystitis. UCx NGTD.  Possible proctitis  o Completed Zithro  o Cont Zosyn      o Pulm following    • HHS/DMII- Resolved  o Adjust Levemir/SSI  •

## 2021-10-03 NOTE — SLP NOTE
SLP spoke with RN. Pt's family met with hospice this morning and is planning to meet with GI this date as well to discuss PEG tube. They are considering both options at this time.   Pt is more lethargic today and was fed a small portion of his morning tra

## 2021-10-03 NOTE — PLAN OF CARE
Pt alert to self. Incomprehensible speech. Bilateral hearing aids. SpO2 98% on RA. Sinus Tachy on Tele. EP. Heparin. Edema to bilateral arms, right arm noted to be cold in temperature.  [place] ORDONEZ, bilateral arm dopplers ordered STAT, still awaiting to be donn ordered medications to maintain glucose within target range  - Assess barriers to adequate nutritional intake and initiate nutrition consult as needed  - Instruct patient on self management of diabetes  10/2/2021 1943 by Oswald Gnozalez, RN  Outcome: Progr

## 2021-10-03 NOTE — PLAN OF CARE
Problem: Patient/Family Goals  Goal: Patient/Family Long Term Goal  Description: Patient's Long Term Goal: discharge to SNF     Interventions:  - dc planning, palliative/hospice  - GI consult for PEG?  - transfer out of ICU   - SLP to assess nutrition ne Progressing  10/3/2021 0414 by Shar Stone RN  Outcome: Progressing     Problem: RESPIRATORY - ADULT  Goal: Achieves optimal ventilation and oxygenation  Description: INTERVENTIONS:  - Assess for changes in respiratory status  - Assess for changes i RN  Outcome: Progressing  10/3/2021 0414 by Shanthi Giraldo RN  Outcome: Progressing     Problem: SAFETY ADULT - FALL  Goal: Free from fall injury  Description: INTERVENTIONS:  - Assess pt frequently for physical needs  - Identify cognitive and physical care  Description: Interventions:  - Assess communication ability and preferred communication style  - Implement communication aides and strategies  - Use visual cues when possible  - Listen attentively, be patient, do not interrupt  - Minimize distraction dressing/incision, wound bed, drain sites and surrounding tissue  - Implement wound care per orders  - Initiate isolation precautions as appropriate  - Initiate Pressure Ulcer prevention bundle as indicated  Outcome: Progressing

## 2021-10-03 NOTE — PROGRESS NOTES
Active issue(s) requiring resolution prior to discharge: mental status-not at baseline; GI consult for PEG?; Palliative/hospice informational meeting; speech    Anticipated discharge date: TBD    Current [facility] plan: [facility]    F/U appointment

## 2021-10-03 NOTE — CONSULTS
[facility]  Report of Consultation    Gil Slavaheriberto Patient Status:  Inpatient    10/10/1940 MRN [id number]   [facility] 5NW-A Attending Alen Lainez MD   [facility] Day # 6 PCP Anastasia Cheadle, MD     Reason for Consultation:  PEG Gait instability     Cognitive communication deficit     Generalized weakness     Physical deconditioning     Hypernatremia     Leukocytosis     Acute renal failure (ARF) ([facility]     Acute kidney injury ([facility]     Azotemia     Hyperglycemia     Septic shock ( PATIENT [place] PREOPERATIVE ORDER FOR IV ANTIBIOTIC SURGICAL SITE INFECTION PROPHYLAXIS.  N/A 5/5/2015    Procedure: COLONOSCOPY, POSSIBLE BIOPSY, POSSIBLE POLYPECTOMY 74852;  Surgeon: Sid Kaur MD;  Location: [address] [place] [address]   • TONSILLECT Units, Subcutaneous, Q8H [facility]        No current facility-administered medications on file prior to encounter. docusate sodium 100 MG Oral Cap, Take 100 mg by mouth 2 (two) times daily. , Disp: , Rfl:   PEG 3350 17 g Oral Powd Pack, Take 17 g by mouth daily (Axillary)   Resp 20   Ht 5' 5\" (1.651 m)   Wt 117 lb (53.1 kg)   SpO2 98%   BMI 19.47 kg/m²     GENERAL: chronically ill appearing.  No distress, elderly appearing  HEENT: normocephalic, mucous membranes moist.  EYES: no scleral icterus   NECK:non tender, last 168 hours. 10/3/21 note  SLP spoke with RN. Pt's family met with hospice this morning and is planning to meet with GI this date as well to discuss PEG tube. They are considering both options at this time.   Pt is more lethargic today and was fed a

## 2021-10-03 NOTE — PLAN OF CARE
Pt alert to self. Sleepy and lethargic today. Bilateral hearing aids. VSS on RA. Tele NSR/ST. 2+ pitting edema to BUE, dopplers negative for DVT. Heparin. Dws.  Attempted to feed breakfast tray this AM, pt failed to swallow and food had to be suctioned out based on oxygen saturation or ABGs  - Provide Smoking Cessation handout, if applicable  - Encourage broncho-pulmonary hygiene including cough, deep breathe, Incentive Spirometry  - Assess the need for suctioning and perform as needed  - Assess and instruct

## 2021-10-04 NOTE — PLAN OF CARE
Problem: Patient/Family Goals  Goal: Patient/Family Long Term Goal  Description: Patient's Long Term Goal: discharge to SNF     Interventions:  - dc planning, palliative/hospice  - GI consult for PEG?  - transfer out of ICU   - SLP to assess nutrition ne respiratory effort  - Oxygen supplementation based on oxygen saturation or ABGs  - Provide Smoking Cessation handout, if applicable  - Encourage broncho-pulmonary hygiene including cough, deep breathe, Incentive Spirometry  - Assess the need for suctioning of injury  - Provide assistive devices as appropriate  - Consider OT/PT consult to assist with strengthening/mobility  - Encourage toileting schedule  Outcome: Progressing     Problem: DISCHARGE PLANNING  Goal: Discharge to home or other facility with appr Administer ordered medications to maintain glucose within target range  - Assess barriers to adequate nutritional intake and initiate nutrition consult as needed  - Instruct patient on self management of diabetes  Outcome: Progressing  Goal: Electrolytes m

## 2021-10-04 NOTE — PROGRESS NOTES
Received pt, Aox1, lethargic, with incomprehensible speech.  on RA with diminished lung sounds. NSR on tele. Afebrile. R CVC triple lumen dressing changed. Lines flushed with blood return, and capped. Heparin SubQ for DVT prophylaxis.  NG with Glucerna 1

## 2021-10-04 NOTE — PROGRESS NOTES
[address] Follow Up     Tevin Rivero  [id number]  Hospital Day #7  Date of Consult: 09/28/21  Patient seen at: [facility]     Subjective:      Patient was seen and examined without family  at the bedside. Coags:  Lab Results   Component Value Date    INR 1.20 (H) 09/28/2021    PTT 31.9 09/28/2021     Chemistry:  Lab Results   Component Value Date    CREATSERUM 0.40 (L) 10/03/2021    BUN 11 10/03/2021     10/04/2021    K 3.5 10/03/2021     tachycardia with stimulation. Lungs: Diminished anterior, labored respiration with stimulation. Abdomen: Soft, hypoactive bowel sounds X 4 quadrants. NG in place with feedings.    Extremities: BUE Edema  present  Neurologic: drowsy, non verbal, attempted Healthcare Agent Appointed: Yes  Healthcare Agent's Name: surrogate [address] Agent's Phone Number: [phone]        Disposition: SNF palliative care      Problem List       Septic shock ([facility]    Hypernatremia    Leukocytosis    Acute

## 2021-10-04 NOTE — PLAN OF CARE
Problem: Septic shock, PNA,   Data: Ax01, self, lethargic. Slept all night. Telemetry sinus rhythm ST w/ activity. Afebrile. , on room air 02 sats 98%. No visual cues of pain, no moaning. Npo, speech to evaluate again ,difficulty swallow. . Duboff tube, prescribed range  Description: INTERVENTIONS:  - Monitor Blood Glucose as ordered  - Assess for signs and symptoms of hyperglycemia and hypoglycemia  - Administer ordered medications to maintain glucose within target range  - Assess barriers to adequate nu fever/infection during anticipated neutropenic period  Description: INTERVENTIONS  - Monitor WBC  - Administer growth factors as ordered  - Implement neutropenic guidelines  Outcome: Progressing     Problem: SAFETY ADULT - FALL  Goal: Free from fall injury Implement communication aides and strategies  - Use visual cues when possible  - Listen attentively, be patient, do not interrupt  - Minimize distractions  - Allow time for understanding and response  - Establish method for patient to ask for assistance (c

## 2021-10-04 NOTE — HOSPICE RN NOTE
Follow up meeting with wife Mia Barnett and their 2 children. Questions answered and disease trajectory r/t advanced Alzheimer's and PD was reviewed including effect on swallowing. Education on comfort/pleasure feeding provided.   They expressed that they will t

## 2021-10-04 NOTE — SLP NOTE
SPEECH DAILY NOTE - INPATIENT    ASSESSMENT & PLAN   ASSESSMENT  Contacted patient at the bedside for swallowing assessment with trials of puree and thin liquids. Patient had been started on a puree diet and thin liquids on 10/2/21 per SLP.  When he was fed independently over 2-3 session(s).   In Progress             FOLLOW UP  Follow Up Needed (Documentation Required): Yes  SLP Follow-up Date: 10/05/21  Number of Visits to Meet Established Goals: 5    Session: 3 of 5    If you have any questions, please conta

## 2021-10-05 NOTE — SLP NOTE
SPEECH DAILY NOTE - INPATIENT    ASSESSMENT & PLAN   ASSESSMENT  Spoke to [facility] Palliative Care RN re: trajectory of dementia and swallowing in this patient. Patient contacted at the bedside.  He was sitting up in the bed in upright and midline pos swallow at bedside In Progress   Goal #2 The patient will tolerate pureed consistency and thin liquids without overt signs or symptoms of aspiration with 100 % accuracy over 2-3 session(s).      In progress   Goal #3 The patient/family/caregiver will demons

## 2021-10-05 NOTE — PLAN OF CARE
Problem: Septic shock, PNA,   Data: Ax01, self, lethargic. Slept all night. Telemetry sinus rhythm ST w/ activity. Febrile 100.2, acetaminophen suppository. , on room air 02 sats 98%. No visual cues of pain, no moaning. Strict Npo.   Duboff tube, darryl range  Description: INTERVENTIONS:  - Monitor Blood Glucose as ordered  - Assess for signs and symptoms of hyperglycemia and hypoglycemia  - Administer ordered medications to maintain glucose within target range  - Assess barriers to adequate nutritional i during anticipated neutropenic period  Description: INTERVENTIONS  - Monitor WBC  - Administer growth factors as ordered  - Implement neutropenic guidelines  Outcome: Progressing     Problem: Altered Communication/Language Barrier  Goal: Patient/Family is ulcer development  - Assess and document skin integrity  - Assess and document dressing/incision, wound bed, drain sites and surrounding tissue  - Implement wound care per orders  - Initiate isolation precautions as appropriate  - Initiate Pressure Ulcer p

## 2021-10-05 NOTE — HOSPICE RN NOTE
Follow up meeting with wife and two children. Decision made to move forward with comfort focused care in respect of expressed wishes of Steve Schneider he made his wishes clear many years ago\". Consents were signed.   He will return to [facility] tomorrow with

## 2021-10-05 NOTE — PROGRESS NOTES
Notified by Johny Wood  That family has chosen hospice and return to [facility] at [phone].  Will sign the POSLT form   Dr Gopal Angel notified    Kelsey Santo NP

## 2021-10-05 NOTE — DIETARY NOTE
[facility]  NUTRITION ASSESSMENT    Pt does not meet malnutrition criteria. NUTRITION INTERVENTION:  1. Meal and Snacks - Advance diet as tolerated per SLP to goal of Carb Controlled 1800kcal/60g CHO; monitor patient po intake.  Encourage adequate p evaluate again today but pt continues to be too altered. Wound care consulted and documented b/l gluteal/sacral unstageable pressure ulcer.  Will provide TF recommendations if DHT placed (per palliative care note yesterday, pt's wife agreeable to [place] protein/day (1.5-2.0 grams protein per kg)  Fluid: ~1 ml/kcal or per MD discretion    NUTRITION DIAGNOSIS/PROBLEM:  Predicted suboptimal energy intake related to decreased intake as evidenced by documented/reported unintentional weight loss    MONITOR AND

## 2021-10-05 NOTE — CM/SW NOTE
CORTES and rn met with family to discuss hospice services. Family are ready to move forward. Consents being completed and plan to discharge back to the [place] tomorrow with hospice. Will plan for 1230 ambulance with a 1/2 soc .

## 2021-10-05 NOTE — PLAN OF CARE
Pt AOx1. Incomprehensible speech. VSS on RA. Tele, NSR. Heparin subcutaneous. E.p. generalized nonpitting edema. DW. Male purewick on. Loose stool. Total care. Bunny boots on. Right subclavian CVC, tko.  Wounds to sacrum and ischium, dressing changed and  c within target range  - Assess barriers to adequate nutritional intake and initiate nutrition consult as needed  - Instruct patient on self management of diabetes  Outcome: Progressing     Problem: RESPIRATORY - ADULT  Goal: Achieves optimal ventilation and SAFETY ADULT - FALL  Goal: Free from fall injury  Description: INTERVENTIONS:  - Assess pt frequently for physical needs  - Identify cognitive and physical deficits and behaviors that affect risk of falls.   - [place] fall precautions as indicated by asse method for patient to ask for assistance (call light)  - Provide an  as needed  - Communicate barriers and strategies to overcome with those who interact with patient  Outcome: Progressing     Problem: METABOLIC/FLUID AND ELECTROLYTES - ADULT  G

## 2021-10-06 NOTE — SLP NOTE
Family has made decision for hospice. Will d/c from services at this time.     Paula Glass MA, [address] [facility]  Pager

## 2021-10-06 NOTE — DISCHARGE PLANNING
NURSING DISCHARGE NOTE    Discharged to the [facility] via Ambulance. Accompanied by Support staff  Belongings Taken by patient/family. Discharge navigator complete. [facility]

## 2021-10-06 NOTE — DISCHARGE SUMMARY
[facility] HOSPITALIST  DISCHARGE SUMMARY     Donna Mckeon Patient Status:  Inpatient    10/10/1940 MRN [id number]   [facility] 5NW-A Attending Kelsey Witt, [address] [facility] Day # 8 PCP Yohana Dewey MD     Date of Admission: 2021  Date was made n.p.o. due to decreased level of consciousness. Patient initially passed swallow but on repeat follow-up patient not initiating any swallowing eating food suctioned from his mouth.   His family wife son and daughter have been kept apprised of alisson swallow  • Completed antibiotic therapy for pneumonia  •     Lab/Test results pending at Discharge:   · None    Consultants:  • Pulmonology, wound care, palliative care, GI, social work    Discharge Medication List:     Discharge Medications      CONTINUE Tabs  Commonly known as: PRAVACHOL      TAKE 1/2 TABLET  DAILY   Quantity: 45 tablet  Refills: 3     rivastigmine 4.6 MG/24HR Pt24  Commonly known as: EXELON      daily.    Refills: 0     Vitamin D3 (Cholecalciferol) 50 MCG (2000 UT) Tabs      Take by mouth

## 2021-10-06 NOTE — PLAN OF CARE
AOx Self, incomprehensible speech, minimal eye contact. VSS, afebrile, no signs of pain. RA . Tele NSR/ST, pitting edema to BUE. Incontinent of BB, male purewick in place for adequate wound healing to sacrum. Dressing  x2 changed and cleansed.  Continuou Glucose as ordered  - Assess for signs and symptoms of hyperglycemia and hypoglycemia  - Administer ordered medications to maintain glucose within target range  - Assess barriers to adequate nutritional intake and initiate nutrition consult as needed  - In INTERVENTIONS  - Monitor WBC  - Administer growth factors as ordered  - Implement neutropenic guidelines  Outcome: Progressing     Problem: SAFETY ADULT - FALL  Goal: Free from fall injury  Description: INTERVENTIONS:  - Assess pt frequently for physical n possible  - Listen attentively, be patient, do not interrupt  - Minimize distractions  - Allow time for understanding and response  - Establish method for patient to ask for assistance (call light)  - Provide an  as needed  - Communicate barrier

## 2021-10-14 PROBLEM — E87.0 HYPERNATREMIA: Status: RESOLVED | Noted: 2021-01-01 | Resolved: 2021-01-01

## 2021-10-14 PROBLEM — R73.9 HYPERGLYCEMIA: Status: RESOLVED | Noted: 2021-01-01 | Resolved: 2021-01-01

## 2021-10-14 NOTE — PROGRESS NOTES
Subjective:   Harshad Esposito is a [de-identified]year old male who presents to [facility] after Readmission for Septic shock and aspiration PNA with failure to thrive and agreed to [facility]:   Date of Admission: 9/27/2021  Date of Discharge: 10/6/2021  Dis Negative. Gastrointestinal: Negative. Skin: Negative. Neurological: Negative. Objective: There were no vitals taken for this visit.  Estimated body mass index is 20.27 kg/m² as calculated from the following:    Height as of 9/27/21: 5' 5 and continue with psych to follow and OP Neuro         Maddison Pino MD,

## (undated) NOTE — IP AVS SNAPSHOT
Patient Demographics     Address  [id number]  [place] [address] Phone  [phone] [facility]) *Preferred*  [phone] [facility]) E-mail Address  [email or web address]      Emergency Contact(s)     Name Relation Home Work Mary Avila [id number] Heparin Sodium (Porcine) 5000 UNIT/ML injection 5,000 Units 10/06/21 0356 Given            LEFT UPPER ABDOMEN     Order ID Medication Name Action Time Action Reason Comments    [id number] Heparin Sodium (Porcine) 5000 UNIT/ML injection 5,000 Units 10/05/21 2300 [facility]    Specimen Information    Type Source Collected On   Blood — 10/06/21 0055          Components    Component Value Reference Range Flag Lab   Sodium 136 136 - 145 mmol/L — [facility] Updated: 10/02/21 1801    Specimen: Blood,peripheral      Blood Culture Result No Growth 5 Days    Urine Culture, Routine [[id number]] Collected: 09/27/21 1654    Order Status: Completed Lab Status: Final result Updated: 09/29/21 0825    Specimen: Urine, cl baseline patient is alert and oriented x1 and today is not speaking at all. No report of any pain. He reportedly did have a temperature spike.     Past Medical History:  Past Medical History:   Diagnosis Date   • [facility]) [facility] 9/27/2021 Katie   • TONSILLECTOMY     • UPPER GI ENDOSCOPY,EXAM         Social History:  reports that he quit smoking about 27 years ago. His smoking use included cigarettes. He has a 37.50 pack-year smoking history.  He quit smokeless tobacco use about 25 year comprehensive 14 point review of systems was completed. Pertinent positives and negatives noted in the HPI.     Physical Exam:    /59   Pulse 119   Temp 97.9 °F (36.6 °C) (Temporal)   Resp (!) 28   Ht 5' 5\" (1.651 m)   Wt 150 lb (68 kg)   SpO2 96% Epic.      ASSESSMENT / PLAN:     1. AMS, hypernatremia, hyperglycemia, ketonuria without overt Acidosis - HHS  1. Insulin gtt, HHS protocol  2. NPO  2. Septic Shock with bilateral Pneumonia  1. Zosyn/Azithromycin  2. Vasopressors per critical care  3.  Rap home with altered mental status. I am unable to gather any subjective history from patient. From chart review patient was brought to the emergency department with fever and altered mental status.   At baseline patient is alert and oriented x1 and today ORDER FOR IV ANTIBIOTIC SURGICAL SITE INFECTION PROPHYLAXIS.  N/A 5/5/2015    Procedure: COLONOSCOPY, POSSIBLE BIOPSY, POSSIBLE POLYPECTOMY 51687;  Surgeon: Leila Hung MD;  Location: [address] [place] [address]   • TONSILLECTOMY     • UPPER GI ENDOSCOPY,E Cholecalciferol (VITAMIN D3) 2000 UNIT Oral Tab, 1 TABLET DAILY, Disp: , Rfl:   Multiple Vitamins-Minerals (MULTI FOR HIM 50+) Oral Tab, Take  by mouth., Disp: , Rfl:         Review of Systems:   A comprehensive 14 point review of systems was completed. Kinase  No results for input(s): CK in the last 168 hours. Inflammatory Markers  No results for input(s): CRP, ISA, LDH, DDIMER in the last 168 hours. Imaging: Imaging data reviewed in Epic.       ASSESSMENT / PLAN:     1. AMS, hypernatremia, hypergly 10/10/1940 MRN [id number]   [facility] 5NW-A Attending Mary Zelaya MD   [facility] Day # 6 PCP Whit Lovett MD     Reason for Consultation:  RICCARDO Dean is a a(n) 79year old male.  Hx via chart and called wife/son (Silvia/Kristy) by deconditioning     Hypernatremia     Leukocytosis     Acute renal failure (ARF) (HCC)     Acute kidney injury (HCC)     Azotemia     Hyperglycemia     Septic shock (HCC)     Goals of care, counseling/discussion     Palliative care encounter     Hyperosmola N/A 5/5/2015    Procedure: COLONOSCOPY, POSSIBLE BIOPSY, POSSIBLE POLYPECTOMY 31965;  Surgeon: Tai Whitt MD;  Location: [address] [place] [address]   • TONSILLECTOMY     • UPPER GI ENDOSCOPY,EXAM         Family History   Problem Relation Age of Onset prior to encounter. docusate sodium 100 MG Oral Cap, Take 100 mg by mouth 2 (two) times daily. , Disp: , Rfl:   PEG 3350 17 g Oral Powd Pack, Take 17 g by mouth daily as needed. , Disp: , Rfl:   Dermatological Products, Misc. (MOISTURE BARRIER) External Oin kg/m²     GENERAL: chronically ill appearing.  No distress, elderly appearing  HEENT: normocephalic, mucous membranes moist.  EYES: no scleral icterus   NECK:non tender, supple  RESPIRATORY: poor effort, otherwise clear  CARDIOVASCULAR: reg rate  ABDOMEN: s morning and is planning to meet with GI this date as well to discuss PEG tube. They are considering both options at this time.   Pt is more lethargic today and was fed a small portion of his morning tray (puree/thin) with no attempts made to swallow and jesika Occupational Therapy Notes (last 72 hours)  Notes from 10/3/2021 10:39 AM through 10/6/2021 10:39 AM   No notes of this type exist for this encounter. Video Swallow Study Notes    No notes of this type exist for this encounter.         SLP Note - SLP See additional Care Plan goals for specific interventions   Patient/Family Short Term Goal   (Resolved)     Interdisciplinary Completed    Description: Patient's Short Term Goal: nutrition  10/1 AM: tolerate TF  10/2 AM: tolerate TF  10/2(noc): complete US

## (undated) NOTE — IP AVS SNAPSHOT
[address]            (For Outpatient Use Only) Initial Admit Date: 9/27/2021   Inpt/Obs Admit Date: Inpt: 9/27/21 / Obs: N/A   Discharge Date:    Kike Pouch:  [de-identified]   MRN: [de-identified]   CSN: [id number]   CEID: [id number] INSURANCE   Payor:  Plan:    Group Number:  Insurance Type:    Subscriber Name:  Subscriber :    Subscriber ID:  Pt Rel to Subscriber:    Hospital Account Financial Class: Medicare    2021